# Patient Record
Sex: MALE | Race: WHITE | NOT HISPANIC OR LATINO | Employment: OTHER | URBAN - NONMETROPOLITAN AREA
[De-identification: names, ages, dates, MRNs, and addresses within clinical notes are randomized per-mention and may not be internally consistent; named-entity substitution may affect disease eponyms.]

---

## 2018-09-05 PROBLEM — E78.5 HYPERLIPIDEMIA LDL GOAL <70: Status: ACTIVE | Noted: 2018-09-05

## 2018-09-05 PROBLEM — I71.40 ABDOMINAL AORTIC ANEURYSM (AAA) WITHOUT RUPTURE: Status: ACTIVE | Noted: 2018-09-05

## 2018-09-05 PROBLEM — I10 ESSENTIAL HYPERTENSION: Status: ACTIVE | Noted: 2018-09-05

## 2018-09-05 PROBLEM — I25.119 CORONARY ARTERY DISEASE INVOLVING NATIVE CORONARY ARTERY OF NATIVE HEART WITH ANGINA PECTORIS: Status: ACTIVE | Noted: 2018-09-05

## 2019-07-24 ENCOUNTER — OFFICE VISIT (OUTPATIENT)
Dept: CARDIOLOGY | Facility: CLINIC | Age: 59
End: 2019-07-24

## 2019-07-24 VITALS
HEART RATE: 70 BPM | OXYGEN SATURATION: 99 % | WEIGHT: 199 LBS | HEIGHT: 63 IN | SYSTOLIC BLOOD PRESSURE: 106 MMHG | BODY MASS INDEX: 35.26 KG/M2 | DIASTOLIC BLOOD PRESSURE: 73 MMHG

## 2019-07-24 DIAGNOSIS — R26.89 IMBALANCE: ICD-10-CM

## 2019-07-24 DIAGNOSIS — R07.2 PRECORDIAL PAIN: ICD-10-CM

## 2019-07-24 DIAGNOSIS — I25.119 CORONARY ARTERY DISEASE INVOLVING NATIVE CORONARY ARTERY OF NATIVE HEART WITH ANGINA PECTORIS (HCC): Primary | ICD-10-CM

## 2019-07-24 PROCEDURE — 99406 BEHAV CHNG SMOKING 3-10 MIN: CPT | Performed by: INTERNAL MEDICINE

## 2019-07-24 PROCEDURE — 99204 OFFICE O/P NEW MOD 45 MIN: CPT | Performed by: INTERNAL MEDICINE

## 2019-07-24 PROCEDURE — 93000 ELECTROCARDIOGRAM COMPLETE: CPT | Performed by: INTERNAL MEDICINE

## 2019-07-24 RX ORDER — PANTOPRAZOLE SODIUM 40 MG/1
40 TABLET, DELAYED RELEASE ORAL DAILY
COMMUNITY

## 2019-07-24 RX ORDER — ATORVASTATIN CALCIUM 20 MG/1
20 TABLET, FILM COATED ORAL DAILY
COMMUNITY
End: 2019-11-19 | Stop reason: SDUPTHER

## 2019-07-24 RX ORDER — OXYCODONE HYDROCHLORIDE 15 MG/1
15 TABLET ORAL 4 TIMES DAILY
COMMUNITY

## 2019-07-24 RX ORDER — GABAPENTIN 100 MG/1
100 CAPSULE ORAL 3 TIMES DAILY
COMMUNITY

## 2019-07-24 RX ORDER — MONTELUKAST SODIUM 10 MG/1
10 TABLET ORAL NIGHTLY
COMMUNITY

## 2019-07-24 RX ORDER — ISOSORBIDE MONONITRATE 30 MG/1
30 TABLET, EXTENDED RELEASE ORAL DAILY
Qty: 90 TABLET | Refills: 3 | Status: SHIPPED | OUTPATIENT
Start: 2019-07-24 | End: 2019-09-17 | Stop reason: SINTOL

## 2019-07-24 RX ORDER — CLOPIDOGREL BISULFATE 75 MG/1
75 TABLET ORAL DAILY
COMMUNITY
End: 2019-11-18 | Stop reason: SDUPTHER

## 2019-07-24 RX ORDER — ASPIRIN 81 MG/1
81 TABLET ORAL DAILY
COMMUNITY
End: 2019-11-18 | Stop reason: SDUPTHER

## 2019-07-24 RX ORDER — MECLIZINE HYDROCHLORIDE 25 MG/1
25 TABLET ORAL 3 TIMES DAILY PRN
COMMUNITY

## 2019-07-24 RX ORDER — LISINOPRIL 2.5 MG/1
2.5 TABLET ORAL DAILY
COMMUNITY
End: 2019-11-18

## 2019-07-24 RX ORDER — CETIRIZINE HYDROCHLORIDE 10 MG/1
10 TABLET ORAL DAILY
COMMUNITY

## 2019-07-24 NOTE — PATIENT INSTRUCTIONS
Steps to Quit Smoking  Smoking tobacco can be bad for your health. It can also affect almost every organ in your body. Smoking puts you and people around you at risk for many serious long-lasting (chronic) diseases. Quitting smoking is hard, but it is one of the best things that you can do for your health. It is never too late to quit.  What are the benefits of quitting smoking?  When you quit smoking, you lower your risk for getting serious diseases and conditions. They can include:  · Lung cancer or lung disease.  · Heart disease.  · Stroke.  · Heart attack.  · Not being able to have children (infertility).  · Weak bones (osteoporosis) and broken bones (fractures).    If you have coughing, wheezing, and shortness of breath, those symptoms may get better when you quit. You may also get sick less often. If you are pregnant, quitting smoking can help to lower your chances of having a baby of low birth weight.  What can I do to help me quit smoking?  Talk with your doctor about what can help you quit smoking. Some things you can do (strategies) include:  · Quitting smoking totally, instead of slowly cutting back how much you smoke over a period of time.  · Going to in-person counseling. You are more likely to quit if you go to many counseling sessions.  · Using resources and support systems, such as:  ? Online chats with a counselor.  ? Phone quitlines.  ? Printed self-help materials.  ? Support groups or group counseling.  ? Text messaging programs.  ? Mobile phone apps or applications.  · Taking medicines. Some of these medicines may have nicotine in them. If you are pregnant or breastfeeding, do not take any medicines to quit smoking unless your doctor says it is okay. Talk with your doctor about counseling or other things that can help you.    Talk with your doctor about using more than one strategy at the same time, such as taking medicines while you are also going to in-person counseling. This can help make  quitting easier.  What things can I do to make it easier to quit?  Quitting smoking might feel very hard at first, but there is a lot that you can do to make it easier. Take these steps:  · Talk to your family and friends. Ask them to support and encourage you.  · Call phone quitlines, reach out to support groups, or work with a counselor.  · Ask people who smoke to not smoke around you.  · Avoid places that make you want (trigger) to smoke, such as:  ? Bars.  ? Parties.  ? Smoke-break areas at work.  · Spend time with people who do not smoke.  · Lower the stress in your life. Stress can make you want to smoke. Try these things to help your stress:  ? Getting regular exercise.  ? Deep-breathing exercises.  ? Yoga.  ? Meditating.  ? Doing a body scan. To do this, close your eyes, focus on one area of your body at a time from head to toe, and notice which parts of your body are tense. Try to relax the muscles in those areas.  · Download or buy apps on your mobile phone or tablet that can help you stick to your quit plan. There are many free apps, such as QuitGuide from the CDC (Centers for Disease Control and Prevention). You can find more support from smokefree.gov and other websites.    This information is not intended to replace advice given to you by your health care provider. Make sure you discuss any questions you have with your health care provider.  Document Released: 10/14/2010 Document Revised: 08/15/2017 Document Reviewed: 05/03/2016  [employer] Interactive Patient Education © 2019 [employer]          Calorie Counting for Weight Loss  Calories are units of energy. Your body needs a certain amount of calories from food to keep you going throughout the day. When you eat more calories than your body needs, your body stores the extra calories as fat. When you eat fewer calories than your body needs, your body burns fat to get the energy it needs.  Calorie counting means keeping track of how many calories you  eat and drink each day. Calorie counting can be helpful if you need to lose weight. If you make sure to eat fewer calories than your body needs, you should lose weight. Ask your health care provider what a healthy weight is for you.  For calorie counting to work, you will need to eat the right number of calories in a day in order to lose a healthy amount of weight per week. A dietitian can help you determine how many calories you need in a day and will give you suggestions on how to reach your calorie goal.  · A healthy amount of weight to lose per week is usually 1-2 lb (0.5-0.9 kg). This usually means that your daily calorie intake should be reduced by 500-750 calories.  · Eating 1,200 - 1,500 calories per day can help most women lose weight.  · Eating 1,500 - 1,800 calories per day can help most men lose weight.    What is my plan?  My goal is to have __________ calories per day.  If I have this many calories per day, I should lose around __________ pounds per week.  What do I need to know about calorie counting?  In order to meet your daily calorie goal, you will need to:  · Find out how many calories are in each food you would like to eat. Try to do this before you eat.  · Decide how much of the food you plan to eat.  · Write down what you ate and how many calories it had. Doing this is called keeping a food log.    To successfully lose weight, it is important to balance calorie counting with a healthy lifestyle that includes regular activity. Aim for 150 minutes of moderate exercise (such as walking) or 75 minutes of vigorous exercise (such as running) each week.  Where do I find calorie information?    The number of calories in a food can be found on a Nutrition Facts label. If a food does not have a Nutrition Facts label, try to look up the calories online or ask your dietitian for help.  Remember that calories are listed per serving. If you choose to have more than one serving of a food, you will have to  "multiply the calories per serving by the amount of servings you plan to eat. For example, the label on a package of bread might say that a serving size is 1 slice and that there are 90 calories in a serving. If you eat 1 slice, you will have eaten 90 calories. If you eat 2 slices, you will have eaten 180 calories.  How do I keep a food log?  Immediately after each meal, record the following information in your food log:  · What you ate. Don't forget to include toppings, sauces, and other extras on the food.  · How much you ate. This can be measured in cups, ounces, or number of items.  · How many calories each food and drink had.  · The total number of calories in the meal.    Keep your food log near you, such as in a small notebook in your pocket, or use a mobile chelle or website. Some programs will calculate calories for you and show you how many calories you have left for the day to meet your goal.  What are some calorie counting tips?  · Use your calories on foods and drinks that will fill you up and not leave you hungry:  ? Some examples of foods that fill you up are nuts and nut butters, vegetables, lean proteins, and high-fiber foods like whole grains. High-fiber foods are foods with more than 5 g fiber per serving.  ? Drinks such as sodas, specialty coffee drinks, alcohol, and juices have a lot of calories, yet do not fill you up.  · Eat nutritious foods and avoid empty calories. Empty calories are calories you get from foods or beverages that do not have many vitamins or protein, such as candy, sweets, and soda. It is better to have a nutritious high-calorie food (such as an avocado) than a food with few nutrients (such as a bag of chips).  · Know how many calories are in the foods you eat most often. This will help you calculate calorie counts faster.  · Pay attention to calories in drinks. Low-calorie drinks include water and unsweetened drinks.  · Pay attention to nutrition labels for \"low fat\" or \"fat " "free\" foods. These foods sometimes have the same amount of calories or more calories than the full fat versions. They also often have added sugar, starch, or salt, to make up for flavor that was removed with the fat.  · Find a way of tracking calories that works for you. Get creative. Try different apps or programs if writing down calories does not work for you.  What are some portion control tips?  · Know how many calories are in a serving. This will help you know how many servings of a certain food you can have.  · Use a measuring cup to measure serving sizes. You could also try weighing out portions on a kitchen scale. With time, you will be able to estimate serving sizes for some foods.  · Take some time to put servings of different foods on your favorite plates, bowls, and cups so you know what a serving looks like.  · Try not to eat straight from a bag or box. Doing this can lead to overeating. Put the amount you would like to eat in a cup or on a plate to make sure you are eating the right portion.  · Use smaller plates, glasses, and bowls to prevent overeating.  · Try not to multitask (for example, watch TV or use your computer) while eating. If it is time to eat, sit down at a table and enjoy your food. This will help you to know when you are full. It will also help you to be aware of what you are eating and how much you are eating.  What are tips for following this plan?  Reading food labels  · Check the calorie count compared to the serving size. The serving size may be smaller than what you are used to eating.  · Check the source of the calories. Make sure the food you are eating is high in vitamins and protein and low in saturated and trans fats.  Shopping  · Read nutrition labels while you shop. This will help you make healthy decisions before you decide to purchase your food.  · Make a grocery list and stick to it.  Cooking  · Try to cook your favorite foods in a healthier way. For example, try baking " instead of frying.  · Use low-fat dairy products.  Meal planning  · Use more fruits and vegetables. Half of your plate should be fruits and vegetables.  · Include lean proteins like poultry and fish.  How do I count calories when eating out?  · Ask for smaller portion sizes.  · Consider sharing an entree and sides instead of getting your own entree.  · If you get your own entree, eat only half. Ask for a box at the beginning of your meal and put the rest of your entree in it so you are not tempted to eat it.  · If calories are listed on the menu, choose the lower calorie options.  · Choose dishes that include vegetables, fruits, whole grains, low-fat dairy products, and lean protein.  · Choose items that are boiled, broiled, grilled, or steamed. Stay away from items that are buttered, battered, fried, or served with cream sauce. Items labeled “crispy” are usually fried, unless stated otherwise.  · Choose water, low-fat milk, unsweetened iced tea, or other drinks without added sugar. If you want an alcoholic beverage, choose a lower calorie option such as a glass of wine or light beer.  · Ask for dressings, sauces, and syrups on the side. These are usually high in calories, so you should limit the amount you eat.  · If you want a salad, choose a garden salad and ask for grilled meats. Avoid extra toppings like sharpe, cheese, or fried items. Ask for the dressing on the side, or ask for olive oil and vinegar or lemon to use as dressing.  · Estimate how many servings of a food you are given. For example, a serving of cooked rice is ½ cup or about the size of half a baseball. Knowing serving sizes will help you be aware of how much food you are eating at restaurants. The list below tells you how big or small some common portion sizes are based on everyday objects:  ? 1 oz--4 stacked dice.  ? 3 oz--1 deck of cards.  ? 1 tsp--1 die.  ? 1 Tbsp--½ a ping-pong ball.  ? 2 Tbsp--1 ping-pong ball.  ? ½ cup--½ baseball.  ? 1  cup--1 baseball.  Summary  · Calorie counting means keeping track of how many calories you eat and drink each day. If you eat fewer calories than your body needs, you should lose weight.  · A healthy amount of weight to lose per week is usually 1-2 lb (0.5-0.9 kg). This usually means reducing your daily calorie intake by 500-750 calories.  · The number of calories in a food can be found on a Nutrition Facts label. If a food does not have a Nutrition Facts label, try to look up the calories online or ask your dietitian for help.  · Use your calories on foods and drinks that will fill you up, and not on foods and drinks that will leave you hungry.  · Use smaller plates, glasses, and bowls to prevent overeating.  This information is not intended to replace advice given to you by your health care provider. Make sure you discuss any questions you have with your health care provider.  Document Released: 12/18/2006 Document Revised: 11/17/2017 Document Reviewed: 11/17/2017  [employer] Interactive Patient Education © 2019 [employer]

## 2019-07-24 NOTE — PROGRESS NOTES
Patient Identification:  Name: Na Laguerre  Age: 58 y.o.  Sex: male  :  1960  MRN: [id number]             Chief Complaint: Chest pain    History of Present Illness:    58-year-old white male, he has a significant history of coronary artery disease, at this point the details are unclear.  He apparently has had a myocardial infarction in the past which sounds like an inferior wall infarct, he states that he had 2 stents placed in [place] apparently.  He apparently had another catheterization at some point but it is unclear where this took place.    For the past 2 months he has had intermittent episodes of off-and-on chest discomfort described as tightness, moderately intense radiating to the left shoulder.  He also reports staggering spells, distinctly denies dizziness but reports staggering like he is drunk at times.  He has mild shortness of breath.  He unfortunately continues to smoke.  He has been compliant with his medications.  He has not had any recent cardiac work-up.    Past Medical History:  Past Medical History:   Diagnosis Date   • GERD (gastroesophageal reflux disease)    • Hyperlipidemia    • Myocardial infarction (CMS/HCC)        Past Surgical History:  Past Surgical History:   Procedure Laterality Date   • CARDIAC CATHETERIZATION     • COLONOSCOPY     • CORONARY STENT PLACEMENT          Home Meds:  Current Outpatient Medications   Medication Sig Dispense Refill   • aspirin 81 MG EC tablet Take 81 mg by mouth Daily.     • atorvastatin (LIPITOR) 20 MG tablet Take 20 mg by mouth Daily.     • cetirizine (zyrTEC) 10 MG tablet Take 10 mg by mouth Daily.     • clopidogrel (PLAVIX) 75 MG tablet Take 75 mg by mouth Daily.     • gabapentin (NEURONTIN) 100 MG capsule Take 100 mg by mouth 3 (Three) Times a Day.     • lisinopril (PRINIVIL,ZESTRIL) 2.5 MG tablet Take 2.5 mg by mouth Daily.     • meclizine (ANTIVERT) 25 MG tablet Take 25 mg by mouth 3 (Three) Times a Day As Needed for dizziness.     •  "METOPROLOL TARTRATE PO Take 12.5 mg by mouth 2 (Two) Times a Day.     • montelukast (SINGULAIR) 10 MG tablet Take 10 mg by mouth Every Night.     • oxyCODONE (ROXICODONE) 15 MG immediate release tablet Take 15 mg by mouth 4 (Four) Times a Day.     • pantoprazole (PROTONIX) 40 MG EC tablet Take 40 mg by mouth Daily.       No current facility-administered medications for this visit.          Allergies:  Allergies   Allergen Reactions   • Codeine Itching       Social History:   Social History     Tobacco Use   • Smoking status: Current Every Day Smoker     Packs/day: 2.00     Years: 35.00     Pack years: 70.00     Types: Cigarettes   • Smokeless tobacco: Never Used   Substance Use Topics   • Alcohol use: No     Frequency: Never        Family History:  No family history on file.     Review of Systems  Review of Systems   Constitution: Positive for malaise/fatigue and weight loss (30lb x 3 months).   Eyes:        Wears glasses   Cardiovascular: Positive for chest pain (mid chest ) and dyspnea on exertion. Negative for irregular heartbeat, leg swelling, near-syncope, orthopnea and palpitations.   Respiratory: Negative for shortness of breath.    Endocrine: Positive for cold intolerance.   Musculoskeletal: Positive for back pain, joint pain, joint swelling and muscle weakness.        Difficulty in walking     Neurological: Positive for dizziness, headaches and numbness (tingling in hands).   Psychiatric/Behavioral: Positive for memory loss. The patient is nervous/anxious.          Physical Exam:  /73 (BP Location: Right arm, Patient Position: Sitting, Cuff Size: Adult)   Pulse 70   Ht 160 cm (63\")   Wt 90.3 kg (199 lb)   SpO2 99%   BMI 35.25 kg/m²          ECG 12 Lead  Date/Time: 7/24/2019 1:39 PM  Performed by: HENRI Watkins MD  Authorized by: HENRI Watkins MD   Comparison: not compared with previous ECG   Previous ECG: no previous ECG available  Rhythm: sinus bradycardia  Rate: bradycardic  BPM: " 59    Clinical impression: normal ECG  Comments: No ST changes             General Appearance:   · well developed  · well nourished  HENT:   · oropharynx moist  · lips not cyanotic  Neck:  · thyroid not enlarged  · supple  Respiratory:  · no respiratory distress  · normal breath sounds  · no rales  Cardiovascular:  · no jugular venous distention  · regular rhythm  · apical impulse normal  · S1 normal, S2 normal  · no S3, no S4   · no murmur  · no rub, no thrill  · carotid pulses normal; no bruit  · pedal pulses normal  · lower extremity edema: none    Gastrointestinal:   · bowel sounds normal  · non-tender  · no hepatomegaly, no splenomegaly  Musculoskeletal:  · no clubbing of fingers.   · normocephalic, head atraumatic  Skin:   · warm, dry  Psychiatric:  · judgement and insight appropriate  · normal mood and affect  ·   Data Review:    Care note reviewed, laboratory data is pending as are the previous cardiac catheterization reports.    I have personally reviewed the EKG tracing and it showed: Sinus bradycardia, rate 59, no ST changes, otherwise normal EKG.      Patient's Body mass index is 35.25 kg/m². BMI is above normal parameters. Recommendations include: educational material and nutrition counseling.       Na Laguerre is a current cigarettes user.  He currently smokes 1 pack of cigarettes per day for a duration of 30 years. I have educated him on the risk of diseases from using tobacco products such as cancer, COPD and heart diease.     I advised him to quit and he is willing to quit. We have discussed the following method/s for tobacco cessation:  Education Material Counseling.  Together we have set a quit date for The patient is still contemplating this.  He will follow up with me in a few month or sooner to check on his progress.    I spent 8 minutes counseling the patient.            DIAGNOSES:     Diagnosis Plan   1. Coronary artery disease involving native coronary artery of native heart with angina  pectoris (CMS/HCC)  Stress Test With Myocardial Perfusion   2. Precordial pain  Stress Test With Myocardial Perfusion   3. Imbalance  US Carotid Bilateral         MANAGEMENT PLAN:    Mr. Laguerre has known coronary artery disease, with previous PCI.  We have not obtained all of his medical records yet.  He has been having intermittent episodes of what sounds like angina over the past couple of months.  He has high risk for recurrent coronary stenosis due to ongoing smoking and known coronary artery disease.  I am adding long-acting nitrate to his regimen.  He will continue dual antiplatelet therapy, statin, beta-blocker.  Stress imaging will be scheduled to evaluate for ischemia burden.  We will try to obtain his records as well and follow-up will be arranged.  The patient is agreeable with this plan.          HENRI Watkins MD  7/24/2019    1:38 PM

## 2019-08-08 ENCOUNTER — HOSPITAL ENCOUNTER (OUTPATIENT)
Dept: NUCLEAR MEDICINE | Facility: HOSPITAL | Age: 59
Discharge: HOME | End: 2019-08-08

## 2019-08-08 ENCOUNTER — HOSPITAL ENCOUNTER (OUTPATIENT)
Dept: CARDIOLOGY | Facility: HOSPITAL | Age: 59
Discharge: HOME | End: 2019-08-08

## 2019-08-08 DIAGNOSIS — R26.89 IMBALANCE: ICD-10-CM

## 2019-08-08 DIAGNOSIS — R07.2 PRECORDIAL PAIN: ICD-10-CM

## 2019-08-08 DIAGNOSIS — I25.119 CORONARY ARTERY DISEASE INVOLVING NATIVE CORONARY ARTERY OF NATIVE HEART WITH ANGINA PECTORIS (HCC): ICD-10-CM

## 2019-08-08 LAB
BH CV NUCLEAR PRIOR STUDY: 3
BH CV STRESS BP STAGE 1: NORMAL
BH CV STRESS BP STAGE 2: NORMAL
BH CV STRESS COMMENTS STAGE 1: NORMAL
BH CV STRESS COMMENTS STAGE 2: NORMAL
BH CV STRESS DOSE REGADENOSON STAGE 1: 0.4
BH CV STRESS DURATION MIN STAGE 1: 0
BH CV STRESS DURATION MIN STAGE 2: 4
BH CV STRESS DURATION SEC STAGE 1: 10
BH CV STRESS DURATION SEC STAGE 2: 0
BH CV STRESS HR STAGE 1: 89
BH CV STRESS HR STAGE 2: 84
BH CV STRESS PROTOCOL 1: NORMAL
BH CV STRESS RECOVERY BP: NORMAL MMHG
BH CV STRESS RECOVERY HR: 62 BPM
BH CV STRESS STAGE 1: 1
BH CV STRESS STAGE 2: 2
LV EF NUC BP: 69 %
MAXIMAL PREDICTED HEART RATE: 161 BPM
PERCENT MAX PREDICTED HR: 55.28 %
STRESS BASELINE BP: NORMAL MMHG
STRESS BASELINE HR: 59 BPM
STRESS PERCENT HR: 65 %
STRESS POST PEAK BP: NORMAL MMHG
STRESS POST PEAK HR: 89 BPM
STRESS TARGET HR: 137 BPM

## 2019-08-08 PROCEDURE — A9500 TC99M SESTAMIBI: HCPCS | Performed by: INTERNAL MEDICINE

## 2019-08-08 PROCEDURE — 0 TECHNETIUM SESTAMIBI: Performed by: INTERNAL MEDICINE

## 2019-08-08 PROCEDURE — 93017 CV STRESS TEST TRACING ONLY: CPT

## 2019-08-08 PROCEDURE — 78452 HT MUSCLE IMAGE SPECT MULT: CPT | Performed by: INTERNAL MEDICINE

## 2019-08-08 PROCEDURE — 93018 CV STRESS TEST I&R ONLY: CPT | Performed by: INTERNAL MEDICINE

## 2019-08-08 PROCEDURE — 78452 HT MUSCLE IMAGE SPECT MULT: CPT

## 2019-08-08 PROCEDURE — 25010000002 REGADENOSON 0.4 MG/5ML SOLUTION: Performed by: INTERNAL MEDICINE

## 2019-08-08 PROCEDURE — 93880 EXTRACRANIAL BILAT STUDY: CPT

## 2019-08-08 PROCEDURE — 93880 EXTRACRANIAL BILAT STUDY: CPT | Performed by: RADIOLOGY

## 2019-08-08 RX ADMIN — TECHNETIUM TC 99M SESTAMIBI 1 DOSE: 1 INJECTION INTRAVENOUS at 09:35

## 2019-08-08 RX ADMIN — TECHNETIUM TC 99M SESTAMIBI 1 DOSE: 1 INJECTION INTRAVENOUS at 11:05

## 2019-08-08 RX ADMIN — REGADENOSON 0.4 MG: 0.08 INJECTION, SOLUTION INTRAVENOUS at 11:05

## 2019-09-17 ENCOUNTER — OFFICE VISIT (OUTPATIENT)
Dept: CARDIOLOGY | Facility: CLINIC | Age: 59
End: 2019-09-17

## 2019-09-17 VITALS
SYSTOLIC BLOOD PRESSURE: 111 MMHG | DIASTOLIC BLOOD PRESSURE: 79 MMHG | HEART RATE: 63 BPM | WEIGHT: 200 LBS | HEIGHT: 63 IN | BODY MASS INDEX: 35.44 KG/M2 | RESPIRATION RATE: 16 BRPM

## 2019-09-17 DIAGNOSIS — I10 ESSENTIAL HYPERTENSION: ICD-10-CM

## 2019-09-17 DIAGNOSIS — R07.2 PRECORDIAL PAIN: ICD-10-CM

## 2019-09-17 DIAGNOSIS — E78.5 HYPERLIPIDEMIA LDL GOAL <70: ICD-10-CM

## 2019-09-17 DIAGNOSIS — I25.119 CORONARY ARTERY DISEASE INVOLVING NATIVE CORONARY ARTERY OF NATIVE HEART WITH ANGINA PECTORIS (HCC): Primary | ICD-10-CM

## 2019-09-17 PROCEDURE — 99213 OFFICE O/P EST LOW 20 MIN: CPT | Performed by: NURSE PRACTITIONER

## 2019-09-17 RX ORDER — RANOLAZINE 500 MG/1
500 TABLET, EXTENDED RELEASE ORAL 2 TIMES DAILY
Qty: 60 TABLET | Refills: 5 | Status: SHIPPED | OUTPATIENT
Start: 2019-09-17 | End: 2020-03-04

## 2019-09-17 NOTE — PROGRESS NOTES
Makenna Peacock PA  Na Laguerre  1960 09/17/2019    Patient Active Problem List   Diagnosis   • Coronary artery disease involving native coronary artery of native heart with angina pectoris (CMS/HCC)   • Essential hypertension   • Hyperlipidemia LDL goal <70   • Abdominal aortic aneurysm (AAA) without rupture (CMS/[facility])       Dear Makenna Peacock PA:    Subjective     Chief Complaint   Patient presents with   • Coronary Artery Disease   • Medication     Verbal    • Results     stress and US carotid           History of Present Illness:    Na Laguerre is a 59 y.o. male with a history of ASCVD status post PCI about 4 years ago with further details unavailable, hypertension, and dyslipidemia.  He presents today for routine cardiology follow-up.  Previously, he was evaluated for chest discomfort and balance disturbance.  He was evaluated further with a carotid Doppler which was unremarkable.  He was also evaluated further with a Lexiscan stress test which revealed no evidence of ischemia.  He reports he has been doing very well over the past several months.  He has occasional chest tightness with moderate exertion.  This is typically alleviated quickly with rest.  He has no associated symptoms.  He does have chronic dyspnea with mild to moderate exertion and reports he has been diagnosed with COPD in the past.  He is a current smoker.  He denies any palpitations, dizziness, lightheadedness, near-syncope, or syncope.  He was prescribed Imdur for his chest pains but was unable to tolerate the medication due to headache.          Allergies   Allergen Reactions   • Codeine Itching   :      Current Outpatient Medications:   •  aspirin 81 MG EC tablet, Take 81 mg by mouth Daily., Disp: , Rfl:   •  atorvastatin (LIPITOR) 20 MG tablet, Take 20 mg by mouth Daily., Disp: , Rfl:   •  cetirizine (zyrTEC) 10 MG tablet, Take 10 mg by mouth Daily., Disp: , Rfl:   •  clopidogrel (PLAVIX) 75 MG tablet, Take 75 mg by mouth  "Daily., Disp: , Rfl:   •  gabapentin (NEURONTIN) 100 MG capsule, Take 100 mg by mouth 3 (Three) Times a Day., Disp: , Rfl:   •  lisinopril (PRINIVIL,ZESTRIL) 2.5 MG tablet, Take 2.5 mg by mouth Daily., Disp: , Rfl:   •  meclizine (ANTIVERT) 25 MG tablet, Take 25 mg by mouth 3 (Three) Times a Day As Needed for dizziness., Disp: , Rfl:   •  METOPROLOL TARTRATE PO, Take 12.5 mg by mouth 2 (Two) Times a Day., Disp: , Rfl:   •  montelukast (SINGULAIR) 10 MG tablet, Take 10 mg by mouth Every Night., Disp: , Rfl:   •  oxyCODONE (ROXICODONE) 15 MG immediate release tablet, Take 15 mg by mouth 4 (Four) Times a Day., Disp: , Rfl:   •  pantoprazole (PROTONIX) 40 MG EC tablet, Take 40 mg by mouth Daily., Disp: , Rfl:   •  ranolazine (RANEXA) 500 MG 12 hr tablet, Take 1 tablet by mouth 2 (Two) Times a Day., Disp: 60 tablet, Rfl: 5      The following portions of the patient's history were reviewed and updated as appropriate: allergies, current medications, past family history, past medical history, past social history, past surgical history and problem list.    Social History     Tobacco Use   • Smoking status: Current Every Day Smoker     Packs/day: 2.00     Years: 35.00     Pack years: 70.00     Types: Cigarettes   • Smokeless tobacco: Never Used   Substance Use Topics   • Alcohol use: No     Frequency: Never   • Drug use: No       Review of Systems   Constitution: Negative for weakness and malaise/fatigue.   Cardiovascular: Positive for chest pain. Negative for dyspnea on exertion, irregular heartbeat, leg swelling, near-syncope, orthopnea, palpitations, paroxysmal nocturnal dyspnea and syncope.   Respiratory: Negative for cough, shortness of breath and wheezing.    Neurological: Positive for loss of balance. Negative for dizziness and light-headedness.       Objective   Vitals:    09/17/19 1133   BP: 111/79   BP Location: Left arm   Pulse: 63   Resp: 16   Weight: 90.7 kg (200 lb)   Height: 160 cm (62.99\")     Body mass index " is 35.44 kg/m².        Physical Exam   Constitutional: He is oriented to person, place, and time. He appears well-developed and well-nourished.   HENT:   Head: Normocephalic and atraumatic.   Cardiovascular: Normal rate, regular rhythm and normal heart sounds. Exam reveals no S3 and no S4.   No murmur heard.  Pulmonary/Chest: Effort normal and breath sounds normal. He has no wheezes. He has no rales.   Abdominal: Soft. Bowel sounds are normal.   Musculoskeletal: He exhibits no edema.   Ambulates with cane   Neurological: He is alert and oriented to person, place, and time.   Skin: Skin is warm and dry.   Psychiatric: He has a normal mood and affect. His behavior is normal.           Procedures      Assessment/Plan    Diagnosis Plan   1. Coronary artery disease involving native coronary artery of native heart with angina pectoris (CMS/[facility])     2. Precordial pain  ranolazine (RANEXA) 500 MG 12 hr tablet   3. Essential hypertension     4. Hyperlipidemia LDL goal <70                  Recommendations:    1.  For his chest pains, will start Ranexa 500 mg twice daily.    2.  He will continue low-dose aspirin, Plavix, atorvastatin, lisinopril, and metoprolol    3.  We will request records of his most recent lipid panel and adjust statin if needed.    4.  I have discussed stress test results and carotid Doppler results with the patient today.    5.  We will request records from his previous encounter for PCI.    6.  Follow-up in 2 months and if needed.      Return in about 6 weeks (around 10/29/2019) for Recheck.    As always, I appreciate very much the opportunity to participate in the cardiovascular care of your patients.      With Best Regards,    DONNELL Peralta

## 2019-10-10 ENCOUNTER — TELEPHONE (OUTPATIENT)
Dept: CARDIOLOGY | Facility: CLINIC | Age: 59
End: 2019-10-10

## 2019-10-31 ENCOUNTER — TRANSCRIBE ORDERS (OUTPATIENT)
Dept: ADMINISTRATIVE | Facility: HOSPITAL | Age: 59
End: 2019-10-31

## 2019-10-31 DIAGNOSIS — R26.81 UNSTEADY: Primary | ICD-10-CM

## 2019-11-04 ENCOUNTER — APPOINTMENT (OUTPATIENT)
Dept: CT IMAGING | Facility: HOSPITAL | Age: 59
End: 2019-11-04

## 2019-11-12 ENCOUNTER — HOSPITAL ENCOUNTER (OUTPATIENT)
Dept: GENERAL RADIOLOGY | Facility: HOSPITAL | Age: 59
Discharge: HOME | End: 2019-11-12

## 2019-11-12 ENCOUNTER — HOSPITAL ENCOUNTER (OUTPATIENT)
Dept: CT IMAGING | Facility: HOSPITAL | Age: 59
Discharge: HOME | End: 2019-11-12
Admitting: FAMILY MEDICINE

## 2019-11-12 ENCOUNTER — TRANSCRIBE ORDERS (OUTPATIENT)
Dept: ADMINISTRATIVE | Facility: HOSPITAL | Age: 59
End: 2019-11-12

## 2019-11-12 DIAGNOSIS — J44.9 CHRONIC OBSTRUCTIVE PULMONARY DISEASE, UNSPECIFIED COPD TYPE (HCC): Primary | ICD-10-CM

## 2019-11-12 DIAGNOSIS — R26.81 UNSTEADY: ICD-10-CM

## 2019-11-12 PROCEDURE — 71046 X-RAY EXAM CHEST 2 VIEWS: CPT

## 2019-11-12 PROCEDURE — 70450 CT HEAD/BRAIN W/O DYE: CPT

## 2019-11-12 PROCEDURE — 70450 CT HEAD/BRAIN W/O DYE: CPT | Performed by: RADIOLOGY

## 2019-11-12 PROCEDURE — 71046 X-RAY EXAM CHEST 2 VIEWS: CPT | Performed by: RADIOLOGY

## 2019-11-18 ENCOUNTER — OFFICE VISIT (OUTPATIENT)
Dept: CARDIOLOGY | Facility: CLINIC | Age: 59
End: 2019-11-18

## 2019-11-18 VITALS
HEART RATE: 54 BPM | OXYGEN SATURATION: 96 % | SYSTOLIC BLOOD PRESSURE: 101 MMHG | WEIGHT: 202.8 LBS | RESPIRATION RATE: 16 BRPM | BODY MASS INDEX: 37.32 KG/M2 | DIASTOLIC BLOOD PRESSURE: 68 MMHG | HEIGHT: 62 IN

## 2019-11-18 DIAGNOSIS — E78.5 HYPERLIPIDEMIA LDL GOAL <70: ICD-10-CM

## 2019-11-18 DIAGNOSIS — I25.119 CORONARY ARTERY DISEASE INVOLVING NATIVE CORONARY ARTERY OF NATIVE HEART WITH ANGINA PECTORIS (HCC): ICD-10-CM

## 2019-11-18 DIAGNOSIS — I71.40 ABDOMINAL AORTIC ANEURYSM (AAA) WITHOUT RUPTURE (HCC): ICD-10-CM

## 2019-11-18 DIAGNOSIS — I10 ESSENTIAL HYPERTENSION: Primary | ICD-10-CM

## 2019-11-18 PROCEDURE — 99214 OFFICE O/P EST MOD 30 MIN: CPT | Performed by: NURSE PRACTITIONER

## 2019-11-18 RX ORDER — DOCUSATE SODIUM 100 MG/1
100 CAPSULE, LIQUID FILLED ORAL AS NEEDED
COMMUNITY
End: 2022-04-05

## 2019-11-18 RX ORDER — CLOPIDOGREL BISULFATE 75 MG/1
75 TABLET ORAL DAILY
Qty: 30 TABLET | Refills: 11 | Status: SHIPPED | OUTPATIENT
Start: 2019-11-18

## 2019-11-18 RX ORDER — ASPIRIN 81 MG/1
81 TABLET ORAL DAILY
Qty: 30 TABLET | Refills: 11 | Status: SHIPPED | OUTPATIENT
Start: 2019-11-18 | End: 2022-04-05

## 2019-11-18 NOTE — PROGRESS NOTES
Karen Angulo MD  Na Laguerre  1960 11/18/2019    Patient Active Problem List   Diagnosis   • Coronary artery disease involving native coronary artery of native heart with angina pectoris (CMS/HCC)   • Essential hypertension   • Hyperlipidemia LDL goal <70   • Abdominal aortic aneurysm (AAA) without rupture (CMS/HCC)       Dear Karen Angulo MD:    Subjective     Chief Complaint   Patient presents with   • Coronary Artery Disease     2 mos follow up   • Palpitations     occas races   • Shortness of Breath     routine activty   • Med Management     list provided           History of Present Illness:    Na Laguerre is a 59 y.o. male with a history of COPD, ASCVD status post PCI about 4 years ago, hypertension, and dyslipidemia.  He presents today for routine cardiology follow-up.  Previously he was having some chest tightness with moderate exertion.  Ranexa was prescribed and the patient's chest pains have now resolved.  He does have chronic dyspnea with mild to moderate exertion he relates with COPD.  He denies any palpitations, dizziness, or lightheadedness.  He does report a chronic, dry hacking cough.  He has tried several medications to help with this and has had no relief.  His PCP has recently ordered a chest x-ray.  He denies weight gain, edema, orthopnea, or PND.          Allergies   Allergen Reactions   • Codeine Itching   :      Current Outpatient Medications:   •  aspirin 81 MG EC tablet, Take 1 tablet by mouth Daily., Disp: 30 tablet, Rfl: 11  •  atorvastatin (LIPITOR) 20 MG tablet, Take 20 mg by mouth Daily., Disp: , Rfl:   •  cetirizine (zyrTEC) 10 MG tablet, Take 10 mg by mouth Daily., Disp: , Rfl:   •  clopidogrel (PLAVIX) 75 MG tablet, Take 1 tablet by mouth Daily., Disp: 30 tablet, Rfl: 11  •  docusate sodium (COLACE) 100 MG capsule, Take 100 mg by mouth As Needed for Constipation., Disp: , Rfl:   •  gabapentin (NEURONTIN) 100 MG capsule, Take 100 mg by mouth 3 (Three) Times a Day.,  "Disp: , Rfl:   •  meclizine (ANTIVERT) 25 MG tablet, Take 25 mg by mouth 3 (Three) Times a Day As Needed for dizziness., Disp: , Rfl:   •  METOPROLOL TARTRATE PO, Take 12.5 mg by mouth 2 (Two) Times a Day., Disp: , Rfl:   •  montelukast (SINGULAIR) 10 MG tablet, Take 10 mg by mouth Every Night., Disp: , Rfl:   •  oxyCODONE (ROXICODONE) 15 MG immediate release tablet, Take 15 mg by mouth 4 (Four) Times a Day., Disp: , Rfl:   •  pantoprazole (PROTONIX) 40 MG EC tablet, Take 40 mg by mouth Daily., Disp: , Rfl:   •  ranolazine (RANEXA) 500 MG 12 hr tablet, Take 1 tablet by mouth 2 (Two) Times a Day., Disp: 60 tablet, Rfl: 5      The following portions of the patient's history were reviewed and updated as appropriate: allergies, current medications, past family history, past medical history, past social history, past surgical history and problem list.    Social History     Tobacco Use   • Smoking status: Current Every Day Smoker     Packs/day: 2.00     Years: 35.00     Pack years: 70.00     Types: Cigarettes   • Smokeless tobacco: Never Used   Substance Use Topics   • Alcohol use: No     Frequency: Never   • Drug use: No       Review of Systems   Constitution: Negative for weakness and malaise/fatigue.   Cardiovascular: Positive for palpitations. Negative for chest pain, dyspnea on exertion, irregular heartbeat, leg swelling, near-syncope, orthopnea, paroxysmal nocturnal dyspnea and syncope.   Respiratory: Positive for shortness of breath. Negative for cough and wheezing.    Neurological: Negative for dizziness and light-headedness.       Objective   Vitals:    11/18/19 1145   BP: 101/68   Pulse: 54   Resp: 16   SpO2: 96%   Weight: 92 kg (202 lb 12.8 oz)   Height: 157.5 cm (62\")     Body mass index is 37.09 kg/m².        Physical Exam   Constitutional: He is oriented to person, place, and time. He appears well-developed and well-nourished.   HENT:   Head: Normocephalic and atraumatic.   Cardiovascular: Normal rate, " regular rhythm and normal heart sounds. Exam reveals no S3 and no S4.   No murmur heard.  Pulmonary/Chest: Effort normal and breath sounds normal. He has no wheezes. He has no rales.   Abdominal: Soft. Bowel sounds are normal.   Musculoskeletal: He exhibits no edema.   Neurological: He is alert and oriented to person, place, and time.   Skin: Skin is warm and dry.   Psychiatric: He has a normal mood and affect. His behavior is normal.       Procedures      Assessment/Plan    Diagnosis Plan   1. Essential hypertension     2. Hyperlipidemia LDL goal <70     3. Coronary artery disease involving native coronary artery of native heart with angina pectoris (CMS/[facility])  aspirin 81 MG EC tablet    clopidogrel (PLAVIX) 75 MG tablet   4. Abdominal aortic aneurysm (AAA) without rupture (CMS/[facility])                  Recommendations:    1. Continue low dose aspirin, atorvastatin, Plavix, metoprolol, and Ranexa.    2. Will discontinue lisinopril to see if this helps to alleviate the cough.    3. Will request most recent lipid panel from his PCP.    4. Follow up in 4 months and PRN.    Return in about 4 months (around 3/18/2020) for Recheck.    As always, I appreciate very much the opportunity to participate in the cardiovascular care of your patients.      With Best Regards,    DONNELL Peralta

## 2019-11-19 RX ORDER — ATORVASTATIN CALCIUM 40 MG/1
40 TABLET, FILM COATED ORAL DAILY
Qty: 30 TABLET | Refills: 2 | Status: SHIPPED | OUTPATIENT
Start: 2019-11-19 | End: 2022-04-05 | Stop reason: SDUPTHER

## 2020-03-04 DIAGNOSIS — R07.2 PRECORDIAL PAIN: ICD-10-CM

## 2020-03-04 RX ORDER — RANOLAZINE 500 MG/1
500 TABLET, EXTENDED RELEASE ORAL 2 TIMES DAILY
Qty: 60 TABLET | Refills: 0 | Status: SHIPPED | OUTPATIENT
Start: 2020-03-04 | End: 2020-04-01

## 2020-04-01 DIAGNOSIS — R07.2 PRECORDIAL PAIN: ICD-10-CM

## 2020-04-01 RX ORDER — RANOLAZINE 500 MG/1
500 TABLET, EXTENDED RELEASE ORAL 2 TIMES DAILY
Qty: 60 TABLET | Refills: 0 | Status: SHIPPED | OUTPATIENT
Start: 2020-04-01

## 2020-08-17 RX ORDER — ISOSORBIDE MONONITRATE 30 MG/1
30 TABLET, EXTENDED RELEASE ORAL DAILY
Qty: 90 TABLET | Refills: 3 | OUTPATIENT
Start: 2020-08-17

## 2021-02-22 DIAGNOSIS — Z23 IMMUNIZATION DUE: ICD-10-CM

## 2021-12-21 ENCOUNTER — TRANSCRIBE ORDERS (OUTPATIENT)
Dept: ADMINISTRATIVE | Facility: HOSPITAL | Age: 61
End: 2021-12-21

## 2021-12-21 DIAGNOSIS — R26.89 BALANCE PROBLEMS: ICD-10-CM

## 2021-12-21 DIAGNOSIS — F17.210 CIGARETTE SMOKER: Primary | ICD-10-CM

## 2022-01-17 ENCOUNTER — APPOINTMENT (OUTPATIENT)
Dept: MRI IMAGING | Facility: HOSPITAL | Age: 62
End: 2022-01-17

## 2022-01-17 ENCOUNTER — APPOINTMENT (OUTPATIENT)
Dept: CT IMAGING | Facility: HOSPITAL | Age: 62
End: 2022-01-17

## 2022-02-07 ENCOUNTER — APPOINTMENT (OUTPATIENT)
Dept: CT IMAGING | Facility: HOSPITAL | Age: 62
End: 2022-02-07

## 2022-02-07 ENCOUNTER — APPOINTMENT (OUTPATIENT)
Dept: MRI IMAGING | Facility: HOSPITAL | Age: 62
End: 2022-02-07

## 2022-02-28 ENCOUNTER — HOSPITAL ENCOUNTER (OUTPATIENT)
Dept: CT IMAGING | Facility: HOSPITAL | Age: 62
Discharge: HOME | End: 2022-02-28
Admitting: FAMILY MEDICINE

## 2022-02-28 DIAGNOSIS — F17.210 CIGARETTE SMOKER: ICD-10-CM

## 2022-02-28 DIAGNOSIS — R26.89 BALANCE PROBLEMS: ICD-10-CM

## 2022-02-28 PROCEDURE — 71271 CT THORAX LUNG CANCER SCR C-: CPT

## 2022-03-28 ENCOUNTER — OFFICE VISIT (OUTPATIENT)
Dept: PULMONOLOGY | Facility: CLINIC | Age: 62
End: 2022-03-28

## 2022-03-28 ENCOUNTER — NURSE NAVIGATOR (OUTPATIENT)
Dept: PULMONOLOGY | Facility: CLINIC | Age: 62
End: 2022-03-28

## 2022-03-28 VITALS
RESPIRATION RATE: 18 BRPM | HEIGHT: 62 IN | TEMPERATURE: 97.8 F | DIASTOLIC BLOOD PRESSURE: 81 MMHG | OXYGEN SATURATION: 96 % | WEIGHT: 224 LBS | SYSTOLIC BLOOD PRESSURE: 112 MMHG | BODY MASS INDEX: 41.22 KG/M2 | HEART RATE: 96 BPM

## 2022-03-28 DIAGNOSIS — J44.9 COPD MIXED TYPE: ICD-10-CM

## 2022-03-28 DIAGNOSIS — Z01.818 OTHER SPECIFIED PRE-OPERATIVE EXAMINATION: Primary | ICD-10-CM

## 2022-03-28 DIAGNOSIS — R91.8 MULTIPLE PULMONARY NODULES: Primary | ICD-10-CM

## 2022-03-28 DIAGNOSIS — Z72.0 TOBACCO ABUSE: ICD-10-CM

## 2022-03-28 PROCEDURE — 99204 OFFICE O/P NEW MOD 45 MIN: CPT | Performed by: INTERNAL MEDICINE

## 2022-03-28 RX ORDER — BUDESONIDE, GLYCOPYRROLATE, AND FORMOTEROL FUMARATE 160; 9; 4.8 UG/1; UG/1; UG/1
2 AEROSOL, METERED RESPIRATORY (INHALATION) 2 TIMES DAILY
Qty: 1 EACH | Refills: 11 | Status: SHIPPED | OUTPATIENT
Start: 2022-03-28 | End: 2022-04-05 | Stop reason: ALTCHOICE

## 2022-03-28 NOTE — PROGRESS NOTES
Nurse Navigator met with patient and family on this date.  Pt is seen in new consultation with Dr. Borja.  Pt was referred to lung nodule clinic following an abnormal chest CT revealed Right lung nodules.  Dr. Borja reviewed Lung Nodule Sheet and Lung RADS scoring with patient and family (printed copies of both also given).  Dr. Borja's plan and recommendation is to prescribe a new maintenance inhaler prescription, to repeat a 3 month chest CT scan (at the end of May 2022) and to follow up in C soon after, and to encourage patient in smoking cessation.  The role of the NN introduced to patient.  NN contact information provided and encouraged for pt/family to call with any questions or concerns.  Pt verbalized understanding and is in agreement with plan.  NN will follow and assist prn.

## 2022-03-28 NOTE — PROGRESS NOTES
PULMONARY  NOTE    Chief Complaint     Abnormal CT scan of the chest, tobacco abuse, COPD, shortness of breath    History of Present Illness     61-year-old male referred for evaluation of an abnormal CT scan of the chest    He underwent an LDCT in February  This was his baseline screening scan  He thinks he may have had a CAT scan in [place] in 2007 but is not sure  This revealed several pulmonary nodules as noted below    He has a long history of tobacco abuse, greater than 50 years  He has smoked up to 2 packs of cigarettes per day  He knows that he needs to stop smoking but has not set up any plans for smoking cessation    He has dyspnea on exertion, not at rest  He gets short of breath bending over or walking up much less than 1 flight of stairs  He has Symbicort and Ventolin  He feels that the inhalers help with the shortness of breath    He has a daily cough, typically in the morning  He has not had hemoptysis    He has not had a recent respiratory tract infection.    Patient Active Problem List   Diagnosis   • Coronary artery disease involving native coronary artery of native heart with angina pectoris (HCC)   • Essential hypertension   • Hyperlipidemia LDL goal <70   • Abdominal aortic aneurysm (AAA) without rupture (HCC)   • Multiple pulmonary nodules (Max 8mm)   • Tobacco abuse   • COPD      Allergies   Allergen Reactions   • Codeine Itching       Current Outpatient Medications:   •  aspirin 81 MG EC tablet, Take 1 tablet by mouth Daily., Disp: 30 tablet, Rfl: 11  •  atorvastatin (LIPITOR) 40 MG tablet, Take 1 tablet by mouth Daily., Disp: 30 tablet, Rfl: 2  •  cetirizine (zyrTEC) 10 MG tablet, Take 10 mg by mouth Daily., Disp: , Rfl:   •  clopidogrel (PLAVIX) 75 MG tablet, Take 1 tablet by mouth Daily., Disp: 30 tablet, Rfl: 11  •  docusate sodium (COLACE) 100 MG capsule, Take 100 mg by mouth As Needed for Constipation., Disp: , Rfl:   •  gabapentin (NEURONTIN) 100 MG capsule, Take 100 mg by mouth 3  "(Three) Times a Day., Disp: , Rfl:   •  meclizine (ANTIVERT) 25 MG tablet, Take 25 mg by mouth 3 (Three) Times a Day As Needed for dizziness., Disp: , Rfl:   •  METOPROLOL TARTRATE PO, Take 12.5 mg by mouth 2 (Two) Times a Day., Disp: , Rfl:   •  montelukast (SINGULAIR) 10 MG tablet, Take 10 mg by mouth Every Night., Disp: , Rfl:   •  oxyCODONE (ROXICODONE) 15 MG immediate release tablet, Take 15 mg by mouth 4 (Four) Times a Day., Disp: , Rfl:   •  pantoprazole (PROTONIX) 40 MG EC tablet, Take 40 mg by mouth Daily., Disp: , Rfl:   •  ranolazine (RANEXA) 500 MG 12 hr tablet, TAKE 1 TABLET BY MOUTH 2 (TWO) TIMES A DAY., Disp: 60 tablet, Rfl: 0  MEDICATION LIST AND ALLERGIES REVIEWED.    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    Review of Systems  ALSO REFER TO SCANNED ROS SHEET FROM SAME DATE.    /81   Pulse 96   Temp 97.8 °F (36.6 °C)   Resp 18   Ht 157.5 cm (62\")   Wt 102 kg (224 lb)   SpO2 96%   BMI 40.97 kg/m²   Physical Exam  Vitals and nursing note reviewed.   Constitutional:       General: He is not in acute distress.     Appearance: He is well-developed. He is not diaphoretic.   HENT:      Head: Normocephalic and atraumatic.   Neck:      Thyroid: No thyromegaly.   Cardiovascular:      Rate and Rhythm: Normal rate and regular rhythm.      " Heart sounds: Normal heart sounds. No murmur heard.  Pulmonary:      Effort: Pulmonary effort is normal.      Breath sounds: Normal breath sounds. No stridor.      Comments: Decreased BS with a few wheezes  Chest:   Breasts:      Right: No supraclavicular adenopathy.      Left: No supraclavicular adenopathy.       Lymphadenopathy:      Cervical: No cervical adenopathy.      Upper Body:      Right upper body: No supraclavicular or epitrochlear adenopathy.      Left upper body: No supraclavicular or epitrochlear adenopathy.   Skin:     General: Skin is warm and dry.   Neurological:      Mental Status: He is alert and oriented to person, place, and time.   Psychiatric:         Behavior: Behavior normal.         Results     LDCT from reviewed on PACS  Two ellipsoid right sided pulmonary nodules noted with the largest about 8mm by my measurement     There is no immunization history on file for this patient.  Problem List       ICD-10-CM ICD-9-CM   1. Multiple pulmonary nodules (Max 8mm)  R91.8 793.19   2. Tobacco abuse  Z72.0 305.1   3. COPD   J44.9 496       Discussion     We reviewed his chest imaging  This is his first/baseline LDCT  He has 2 pulmonary nodules, largest of which is about 8 mm in size  Unfortunately, no other chest imaging available for comparison  Therefore, according to lung RADS criteria, he is a category 4A.  I would not recommend a PET scan at this point and these lesions would be extremely difficult to biopsy.  Therefore, I recommended a short interval follow-up CT scan of the chest in 3 months  We will arrange the CT scan    He has COPD, probably at least moderate to severe  We discussed the need for smoking cessation and we discussed smoking cessation strategies    We will change his Symbicort to Breztri if it appears that insurance will cover it.    I will plan to see him back in 3 months for follow-up  We will get pulmonary function test prior to his return    Moderate level of Medical  Decision Making complexity based on 1 undiagnosed new problem, independent interpretation of tests, and/or prescription drug management     Enrique Borja MD  Note electronically signed    CC: Chante Wei MD

## 2022-04-05 ENCOUNTER — OFFICE VISIT (OUTPATIENT)
Dept: CARDIOLOGY | Facility: CLINIC | Age: 62
End: 2022-04-05

## 2022-04-05 VITALS
SYSTOLIC BLOOD PRESSURE: 110 MMHG | DIASTOLIC BLOOD PRESSURE: 74 MMHG | TEMPERATURE: 97 F | HEIGHT: 62 IN | OXYGEN SATURATION: 95 % | WEIGHT: 209 LBS | HEART RATE: 59 BPM | BODY MASS INDEX: 38.46 KG/M2

## 2022-04-05 DIAGNOSIS — I25.10 CORONARY ARTERY DISEASE DUE TO LIPID RICH PLAQUE: Primary | ICD-10-CM

## 2022-04-05 DIAGNOSIS — I25.83 CORONARY ARTERY DISEASE DUE TO LIPID RICH PLAQUE: Primary | ICD-10-CM

## 2022-04-05 PROCEDURE — 93000 ELECTROCARDIOGRAM COMPLETE: CPT | Performed by: PHYSICIAN ASSISTANT

## 2022-04-05 PROCEDURE — 99214 OFFICE O/P EST MOD 30 MIN: CPT | Performed by: PHYSICIAN ASSISTANT

## 2022-04-05 RX ORDER — ALBUTEROL SULFATE 90 UG/1
2 AEROSOL, METERED RESPIRATORY (INHALATION) EVERY 4 HOURS PRN
COMMUNITY

## 2022-04-05 RX ORDER — DIPHENOXYLATE HYDROCHLORIDE AND ATROPINE SULFATE 2.5; .025 MG/1; MG/1
TABLET ORAL DAILY
COMMUNITY

## 2022-04-05 RX ORDER — NITROGLYCERIN 0.4 MG/1
0.4 TABLET SUBLINGUAL
COMMUNITY

## 2022-04-05 RX ORDER — ALBUTEROL SULFATE 2.5 MG/3ML
2.5 SOLUTION RESPIRATORY (INHALATION) EVERY 4 HOURS PRN
COMMUNITY

## 2022-04-05 RX ORDER — ATORVASTATIN CALCIUM 80 MG/1
80 TABLET, FILM COATED ORAL DAILY
Qty: 30 TABLET | Refills: 2 | Status: SHIPPED | OUTPATIENT
Start: 2022-04-05

## 2022-04-05 RX ORDER — LOSARTAN POTASSIUM 25 MG/1
12.5 TABLET ORAL DAILY
Qty: 45 TABLET | Refills: 3 | Status: SHIPPED | OUTPATIENT
Start: 2022-04-05

## 2022-04-05 NOTE — PROGRESS NOTES
Chante Wei MD  Na Laguerre  1960 04/05/2022    Patient Active Problem List   Diagnosis   • Coronary artery disease involving native coronary artery of native heart with angina pectoris (HCC)   • Essential hypertension   • Hyperlipidemia LDL goal <70   • Abdominal aortic aneurysm (AAA) without rupture (HCC)   • Multiple pulmonary nodules (Max 8mm)   • Tobacco abuse   • COPD        Dear Chante Wei MD:    Subjective     History of Present Illness:    Chief Complaint   Patient presents with   • Med Management     List.    • Shortness of Breath   • Edema       Na Laguerre is a pleasant 61 y.o. male with a past medical history significant for ASCVD S/P PCI in 2015, essential hypertension, dyslipidemia, COPD 2/2 to tobacco abuse. He comes in today for routine cardiology follow up.     Mr. Laguerre has been lost to follow up since 11/18/2019, having canceled or no showed several previous appointments. He comes in today following the advise of his pcp after CT scan of the chest showed severe coronary calcifications. Clinically he is completely asymptomatic. He denies any chest pains whatsoever, shortness of breath that is worse that his base line. He also denies any palpitations, dizziness, or syncope.     Allergies   Allergen Reactions   • Codeine Itching   :      Current Outpatient Medications:   •  albuterol (PROVENTIL) (2.5 MG/3ML) 0.083% nebulizer solution, Take 2.5 mg by nebulization Every 4 (Four) Hours As Needed for Wheezing., Disp: , Rfl:   •  albuterol sulfate  (90 Base) MCG/ACT inhaler, Inhale 2 puffs Every 4 (Four) Hours As Needed for Wheezing., Disp: , Rfl:   •  atorvastatin (LIPITOR) 80 MG tablet, Take 1 tablet by mouth Daily., Disp: 30 tablet, Rfl: 2  •  Budeson-Glycopyrrol-Formoterol (BREZTRI) 160-9-4.8 MCG/ACT aerosol inhaler, Inhale 2 puffs 2 (Two) Times a Day., Disp: , Rfl:   •  cetirizine (zyrTEC) 10 MG tablet, Take 10 mg by mouth Daily., Disp: , Rfl:   •   "clopidogrel (PLAVIX) 75 MG tablet, Take 1 tablet by mouth Daily., Disp: 30 tablet, Rfl: 11  •  gabapentin (NEURONTIN) 100 MG capsule, Take 100 mg by mouth 3 (Three) Times a Day., Disp: , Rfl:   •  meclizine (ANTIVERT) 25 MG tablet, Take 25 mg by mouth 3 (Three) Times a Day As Needed for dizziness., Disp: , Rfl:   •  METOPROLOL TARTRATE PO, Take 12.5 mg by mouth 2 (Two) Times a Day., Disp: , Rfl:   •  montelukast (SINGULAIR) 10 MG tablet, Take 10 mg by mouth Every Night., Disp: , Rfl:   •  multivitamin (MULTI-DAY VITAMINS PO), Take  by mouth Daily., Disp: , Rfl:   •  nitroglycerin (NITROSTAT) 0.4 MG SL tablet, Place 0.4 mg under the tongue Every 5 (Five) Minutes As Needed for Chest Pain. Take no more than 3 doses in 15 minutes., Disp: , Rfl:   •  oxyCODONE (ROXICODONE) 15 MG immediate release tablet, Take 15 mg by mouth 4 (Four) Times a Day., Disp: , Rfl:   •  pantoprazole (PROTONIX) 40 MG EC tablet, Take 40 mg by mouth Daily., Disp: , Rfl:   •  losartan (Cozaar) 25 MG tablet, Take 0.5 tablets by mouth Daily., Disp: 45 tablet, Rfl: 3  •  ranolazine (RANEXA) 500 MG 12 hr tablet, TAKE 1 TABLET BY MOUTH 2 (TWO) TIMES A DAY., Disp: 60 tablet, Rfl: 0    The following portions of the patient's history were reviewed and updated as appropriate: allergies, current medications, past family history, past medical history, past social history, past surgical history and problem list.    Social History     Tobacco Use   • Smoking status: Current Every Day Smoker     Packs/day: 2.00     Years: 35.00     Pack years: 70.00     Types: Cigarettes   • Smokeless tobacco: Never Used   Substance Use Topics   • Alcohol use: No   • Drug use: No         Objective   Vitals:    04/05/22 1142   BP: 110/74   BP Location: Right arm   Patient Position: Sitting   Cuff Size: Adult   Pulse: 59   Temp: 97 °F (36.1 °C)   TempSrc: Infrared   SpO2: 95%   Weight: 94.8 kg (209 lb)   Height: 157.5 cm (62\")     Body mass index is 38.23 " kg/m².    Constitutional:       General: Not in acute distress.     Appearance: Healthy appearance. Well-developed and not in distress. Not diaphoretic.   Eyes:      Conjunctiva/sclera: Conjunctivae normal.      Pupils: Pupils are equal, round, and reactive to light.   HENT:      Head: Normocephalic and atraumatic.   Neck:      Vascular: No carotid bruit or JVD.   Pulmonary:      Effort: Pulmonary effort is normal. No respiratory distress.      Breath sounds: Normal breath sounds.   Cardiovascular:      Normal rate. Regular rhythm.   Skin:     General: Skin is cool.   Neurological:      Mental Status: Alert, oriented to person, place, and time and oriented to person, place and time.         No results found for: NA, K, CL, CO2, BUN, CREATININE, LABGLOM, GLUCOSE, CALCIUM, AST, ALT, ALKPHOS, LABIL2  No results found for: CKTOTAL  No results found for: WBC, HGB, HCT, PLT  No results found for: INR  No results found for: MG  No results found for: TSH, PSA, CHLPL, TRIG, HDL, LDL   No results found for: BNP    During this visit the following were done:  Labs Reviewed []    Labs Ordered []    Radiology Reports Reviewed []    Radiology Ordered []    PCP Records Reviewed []    Referring Provider Records Reviewed []    ER Records Reviewed []    Hospital Records Reviewed []    History Obtained From Family []    Radiology Images Reviewed []    Other Reviewed []    Records Requested []         ECG 12 Lead    Date/Time: 4/5/2022 11:38 AM  Performed by: Zackery Sofia PA-C  Authorized by: Zackery Sofia PA-C   Comparison: compared with previous ECG   Similar to previous ECG  Rhythm: sinus rhythm  Conduction: conduction normal  ST Segments: ST segments normal    Clinical impression: normal ECG            Assessment/Plan    Diagnosis Plan   1. Coronary artery disease due to lipid rich plaque  Comprehensive Metabolic Panel            Recommendations:  1. CAD s/p stenting  1. Reviewed CT scan of chest that showed coronary artery  calcifications. patient does have known CAD with previous stenting, thus calcifications are expected. He is completely asymptomatic at this time so will continue with GDMT at this time.   2. I will start him on losartan 12.5 mg daily.   3. Continue lipitor, plavix, metoprolol  2. Dyslipidemia   1. His cholesterol is elevated from pcp's office. I will increase lipitor to 80 mg daily and recheck CMP and lipid panel in a month      Return in about 3 months (around 7/5/2022).    As always, I appreciate very much the opportunity to participate in the cardiovascular care of your patients.      With Best Regards,    Zackery Sofia PA-C

## 2022-04-06 ENCOUNTER — TELEPHONE (OUTPATIENT)
Dept: CARDIOLOGY | Facility: CLINIC | Age: 62
End: 2022-04-06

## 2022-04-06 NOTE — TELEPHONE ENCOUNTER
----- Message from Zackery Sofia PA-C sent at 4/5/2022  3:20 PM EDT -----  Please let him know that after viewing labs from pcp his choelsterol is elevated above goal so I want him to increase his lipitor to 80 mg daily.       Patient's girlfriend informed per [employer].  She verbalized understanding.

## 2022-04-07 ENCOUNTER — APPOINTMENT (OUTPATIENT)
Dept: MRI IMAGING | Facility: HOSPITAL | Age: 62
End: 2022-04-07

## 2022-04-18 ENCOUNTER — APPOINTMENT (OUTPATIENT)
Dept: RESPIRATORY THERAPY | Facility: HOSPITAL | Age: 62
End: 2022-04-18

## 2022-04-19 ENCOUNTER — APPOINTMENT (OUTPATIENT)
Dept: RESPIRATORY THERAPY | Facility: HOSPITAL | Age: 62
End: 2022-04-19

## 2022-04-20 ENCOUNTER — APPOINTMENT (OUTPATIENT)
Dept: RESPIRATORY THERAPY | Facility: HOSPITAL | Age: 62
End: 2022-04-20

## 2022-04-28 ENCOUNTER — APPOINTMENT (OUTPATIENT)
Dept: MRI IMAGING | Facility: HOSPITAL | Age: 62
End: 2022-04-28

## 2022-05-04 ENCOUNTER — HOSPITAL ENCOUNTER (OUTPATIENT)
Dept: MRI IMAGING | Facility: HOSPITAL | Age: 62
Discharge: HOME | End: 2022-05-04
Admitting: FAMILY MEDICINE

## 2022-05-04 DIAGNOSIS — R26.89 BALANCE PROBLEMS: ICD-10-CM

## 2022-05-04 DIAGNOSIS — F17.210 CIGARETTE SMOKER: ICD-10-CM

## 2022-05-04 LAB — CREAT BLDA-MCNC: 1.6 MG/DL (ref 0.6–1.3)

## 2022-05-04 PROCEDURE — 0 GADOBENATE DIMEGLUMINE 529 MG/ML SOLUTION: Performed by: FAMILY MEDICINE

## 2022-05-04 PROCEDURE — 0 GADOBENATE DIMEGLUMINE 529 MG/ML SOLUTION

## 2022-05-04 PROCEDURE — A9577 INJ MULTIHANCE: HCPCS

## 2022-05-04 PROCEDURE — 70553 MRI BRAIN STEM W/O & W/DYE: CPT

## 2022-05-04 PROCEDURE — A9577 INJ MULTIHANCE: HCPCS | Performed by: FAMILY MEDICINE

## 2022-05-04 PROCEDURE — 70553 MRI BRAIN STEM W/O & W/DYE: CPT | Performed by: RADIOLOGY

## 2022-05-04 PROCEDURE — 82565 ASSAY OF CREATININE: CPT

## 2022-05-04 RX ADMIN — GADOBENATE DIMEGLUMINE 19 ML: 529 INJECTION, SOLUTION INTRAVENOUS at 10:51

## 2022-05-27 ENCOUNTER — HOSPITAL ENCOUNTER (OUTPATIENT)
Dept: CT IMAGING | Facility: HOSPITAL | Age: 62
Discharge: HOME | End: 2022-05-27
Admitting: INTERNAL MEDICINE

## 2022-05-27 DIAGNOSIS — J44.9 COPD MIXED TYPE: ICD-10-CM

## 2022-05-27 DIAGNOSIS — R91.8 MULTIPLE PULMONARY NODULES: ICD-10-CM

## 2022-05-27 DIAGNOSIS — Z72.0 TOBACCO ABUSE: ICD-10-CM

## 2022-05-27 PROCEDURE — 71250 CT THORAX DX C-: CPT

## 2022-05-27 PROCEDURE — 71250 CT THORAX DX C-: CPT | Performed by: RADIOLOGY

## 2022-09-06 ENCOUNTER — TELEPHONE (OUTPATIENT)
Dept: PULMONOLOGY | Facility: CLINIC | Age: 62
End: 2022-09-06

## 2022-09-06 NOTE — TELEPHONE ENCOUNTER
Fax refill request for Rx Breztri denied at this time. Pt will need to schedule an apt for further refills.

## 2023-03-30 RX ORDER — BUDESONIDE, GLYCOPYRROLATE, AND FORMOTEROL FUMARATE 160; 9; 4.8 UG/1; UG/1; UG/1
AEROSOL, METERED RESPIRATORY (INHALATION)
Qty: 10.7 G | OUTPATIENT
Start: 2023-03-30

## 2023-05-26 ENCOUNTER — TRANSCRIBE ORDERS (OUTPATIENT)
Dept: ADMINISTRATIVE | Facility: HOSPITAL | Age: 63
End: 2023-05-26

## 2023-05-26 DIAGNOSIS — R91.1 LUNG NODULE: Primary | ICD-10-CM

## 2023-05-30 ENCOUNTER — TRANSCRIBE ORDERS (OUTPATIENT)
Dept: ADMINISTRATIVE | Facility: HOSPITAL | Age: 63
End: 2023-05-30

## 2023-05-30 DIAGNOSIS — R91.8 LUNG MASS: Primary | ICD-10-CM

## 2024-02-14 ENCOUNTER — TELEPHONE (OUTPATIENT)
Dept: CARDIOLOGY | Facility: CLINIC | Age: 64
End: 2024-02-14
Payer: MEDICARE

## 2024-02-15 DIAGNOSIS — I25.10 CORONARY ARTERY DISEASE DUE TO LIPID RICH PLAQUE: Primary | ICD-10-CM

## 2024-02-15 DIAGNOSIS — I25.83 CORONARY ARTERY DISEASE DUE TO LIPID RICH PLAQUE: Primary | ICD-10-CM

## 2024-07-30 ENCOUNTER — TRANSCRIBE ORDERS (OUTPATIENT)
Dept: ADMINISTRATIVE | Facility: HOSPITAL | Age: 64
End: 2024-07-30
Payer: MEDICARE

## 2024-07-30 DIAGNOSIS — M54.2 CERVICALGIA: Primary | ICD-10-CM

## 2024-07-30 DIAGNOSIS — M54.16 LUMBAR RADICULOPATHY: ICD-10-CM

## 2024-08-12 ENCOUNTER — OFFICE VISIT (OUTPATIENT)
Dept: CARDIOLOGY | Facility: CLINIC | Age: 64
End: 2024-08-12
Payer: MEDICARE

## 2024-08-12 VITALS
BODY MASS INDEX: 34.52 KG/M2 | HEIGHT: 65 IN | WEIGHT: 207.2 LBS | OXYGEN SATURATION: 95 % | HEART RATE: 68 BPM | SYSTOLIC BLOOD PRESSURE: 107 MMHG | DIASTOLIC BLOOD PRESSURE: 74 MMHG

## 2024-08-12 DIAGNOSIS — I25.119 CORONARY ARTERY DISEASE INVOLVING NATIVE CORONARY ARTERY OF NATIVE HEART WITH ANGINA PECTORIS: Primary | ICD-10-CM

## 2024-08-12 DIAGNOSIS — R07.2 PRECORDIAL PAIN: ICD-10-CM

## 2024-08-12 DIAGNOSIS — I71.40 ABDOMINAL AORTIC ANEURYSM (AAA) WITHOUT RUPTURE, UNSPECIFIED PART: ICD-10-CM

## 2024-08-12 DIAGNOSIS — I10 ESSENTIAL HYPERTENSION: ICD-10-CM

## 2024-08-12 PROCEDURE — 3078F DIAST BP <80 MM HG: CPT | Performed by: PHYSICIAN ASSISTANT

## 2024-08-12 PROCEDURE — 99214 OFFICE O/P EST MOD 30 MIN: CPT | Performed by: PHYSICIAN ASSISTANT

## 2024-08-12 PROCEDURE — 3074F SYST BP LT 130 MM HG: CPT | Performed by: PHYSICIAN ASSISTANT

## 2024-08-12 PROCEDURE — 93000 ELECTROCARDIOGRAM COMPLETE: CPT | Performed by: PHYSICIAN ASSISTANT

## 2024-08-12 RX ORDER — DIPHENHYDRAMINE HCL 25 MG
25 CAPSULE ORAL 2 TIMES DAILY
COMMUNITY

## 2024-08-12 RX ORDER — NITROGLYCERIN 0.4 MG/1
0.4 TABLET SUBLINGUAL
Qty: 100 TABLET | Refills: 2 | Status: SHIPPED | OUTPATIENT
Start: 2024-08-12

## 2024-08-12 RX ORDER — OMEGA-3S/DHA/EPA/FISH OIL/D3 300MG-1000
400 CAPSULE ORAL DAILY
COMMUNITY

## 2024-08-12 RX ORDER — PRAMIPEXOLE DIHYDROCHLORIDE 0.25 MG/1
1 TABLET ORAL 3 TIMES DAILY
COMMUNITY
Start: 2024-07-08

## 2024-08-12 NOTE — PROGRESS NOTES
Chante Wei MD  Na Laguerre  1960 08/12/2024    Patient Active Problem List   Diagnosis    Coronary artery disease involving native coronary artery of native heart with angina pectoris    Essential hypertension    Hyperlipidemia LDL goal <70    Abdominal aortic aneurysm (AAA) without rupture    Multiple pulmonary nodules (Max 8mm)    Tobacco abuse    COPD        Dear Chante Wei MD:    Subjective     Follow-upCurrent symptoms: dizziness and shortness of breath.   Shortness of Breath    Dizziness    :    Chief Complaint   Patient presents with    Follow-up     routine    Shortness of Breath     On exertion    Dizziness       Na Laguerre is a pleasant 63 y.o. male with a past medical history significant for ASCVD S/P PCI in 2015, essential hypertension, dyslipidemia, COPD 2/2 to tobacco abuse. He comes in today for routine cardiology follow up.     Mr. Laguerre comes in today after being lost to follow-up since April 2022.  He comes in today for medication refill/routine follow-up.  Upon further questioning he does admit he has been having some pains in his upper back that has been occurring about once a month.  He reports that this seems to come on at random with no relation to physical exertion.  However, he admits he has been fairly sedentary other than mowing his lawn with a riding lawnmower he has not been doing much other physical exertion.  He does state back in 2015 when he received his PCI he was having similar pains then as well.  He does report that this pain will persist until he takes at least 1 sublingual nitroglycerin and at times has required 2.  He reports that he also develops diaphoresis and nausea when his pain is present.  Of note he states these episodes are typically occurring just once monthly.    Allergies   Allergen Reactions    Codeine Itching   :      Current Outpatient Medications:     albuterol (PROVENTIL) (2.5 MG/3ML) 0.083% nebulizer solution, Take 2.5  mg by nebulization Every 4 (Four) Hours As Needed for Wheezing., Disp: , Rfl:     albuterol sulfate  (90 Base) MCG/ACT inhaler, Inhale 2 puffs Every 4 (Four) Hours As Needed for Wheezing., Disp: , Rfl:     atorvastatin (LIPITOR) 80 MG tablet, Take 1 tablet by mouth Daily., Disp: 30 tablet, Rfl: 2    Budeson-Glycopyrrol-Formoterol (BREZTRI) 160-9-4.8 MCG/ACT aerosol inhaler, Inhale 2 puffs 2 (Two) Times a Day., Disp: , Rfl:     Cholecalciferol 10 MCG (400 UNIT) tablet, Take 1 tablet by mouth Daily., Disp: , Rfl:     clopidogrel (PLAVIX) 75 MG tablet, Take 1 tablet by mouth Daily., Disp: 30 tablet, Rfl: 11    diphenhydrAMINE (BENADRYL) 25 mg capsule, Take 1 capsule by mouth 2 (Two) Times a Day., Disp: , Rfl:     gabapentin (NEURONTIN) 100 MG capsule, Take 1 capsule by mouth 3 (Three) Times a Day., Disp: , Rfl:     losartan (Cozaar) 25 MG tablet, Take 0.5 tablets by mouth Daily., Disp: 45 tablet, Rfl: 3    meclizine (ANTIVERT) 25 MG tablet, Take 1 tablet by mouth 3 (Three) Times a Day As Needed for Dizziness., Disp: , Rfl:     METOPROLOL TARTRATE PO, Take 12.5 mg by mouth 2 (Two) Times a Day., Disp: , Rfl:     nitroglycerin (NITROSTAT) 0.4 MG SL tablet, Place 1 tablet under the tongue Every 5 (Five) Minutes As Needed for Chest Pain. Take no more than 3 doses in 15 minutes., Disp: 100 tablet, Rfl: 2    oxyCODONE (ROXICODONE) 15 MG immediate release tablet, Take 1 tablet by mouth 4 (Four) Times a Day., Disp: , Rfl:     pantoprazole (PROTONIX) 40 MG EC tablet, Take 1 tablet by mouth Daily., Disp: , Rfl:     pramipexole (MIRAPEX) 0.25 MG tablet, Take 1 tablet by mouth 3 times a day., Disp: , Rfl:     cetirizine (zyrTEC) 10 MG tablet, Take 10 mg by mouth Daily. (Patient not taking: Reported on 8/12/2024), Disp: , Rfl:     montelukast (SINGULAIR) 10 MG tablet, Take 10 mg by mouth Every Night. (Patient not taking: Reported on 8/12/2024), Disp: , Rfl:     multivitamin (MULTI-DAY VITAMINS PO), Take  by mouth Daily.  "(Patient not taking: Reported on 8/12/2024), Disp: , Rfl:     ranolazine (RANEXA) 500 MG 12 hr tablet, TAKE 1 TABLET BY MOUTH 2 (TWO) TIMES A DAY. (Patient not taking: Reported on 8/12/2024), Disp: 60 tablet, Rfl: 0    The following portions of the patient's history were reviewed and updated as appropriate: allergies, current medications, past family history, past medical history, past social history, past surgical history and problem list.    Social History     Tobacco Use    Smoking status: Every Day     Current packs/day: 2.00     Average packs/day: 2.0 packs/day for 35.0 years (70.0 ttl pk-yrs)     Types: Cigarettes     Passive exposure: Current    Smokeless tobacco: Never   Vaping Use    Vaping status: Never Used   Substance Use Topics    Alcohol use: No    Drug use: No         Objective   Vitals:    08/12/24 0913   BP: 107/74   Pulse: 68   SpO2: 95%   Weight: 94 kg (207 lb 3.2 oz)   Height: 165.1 cm (65\")     Body mass index is 34.48 kg/m².    Review of Systems   Respiratory:  Positive for shortness of breath.    Neurological:  Positive for dizziness.       Physical Exam    Lab Results   Component Value Date    CREATININE 1.60 (H) 05/04/2022     No results found for: \"CKTOTAL\"  No results found for: \"WBC\", \"HGB\", \"HCT\", \"PLT\"  No results found for: \"INR\"  No results found for: \"MG\"  No results found for: \"TSH\", \"PSA\", \"CHLPL\", \"TRIG\", \"HDL\", \"LDL\"   No results found for: \"BNP\"    During this visit the following were done:  Labs Reviewed []    Labs Ordered []    Radiology Reports Reviewed []    Radiology Ordered []    PCP Records Reviewed []    Referring Provider Records Reviewed []    ER Records Reviewed []    Hospital Records Reviewed []    History Obtained From Family []    Radiology Images Reviewed []    Other Reviewed []    Records Requested []         ECG 12 Lead    Date/Time: 8/12/2024 9:17 AM  Performed by: Zackery Sofia PA-C    Authorized by: Zackery Sofia PA-C  Comparison: compared with " previous ECG   Similar to previous ECG  Rhythm: sinus rhythm  ST Segments: ST segments normal    Clinical impression: normal ECG        Assessment & Plan    Diagnosis Plan   1. Coronary artery disease involving native coronary artery of native heart with angina pectoris  ECG 12 Lead    Stress Test With Myocardial Perfusion One Day    Adult Transthoracic Echo Complete W/ Cont if Necessary Per Protocol    CT Chest Without Contrast    Comprehensive Metabolic Panel    CBC (No Diff)    Lipid Panel    TSH      2. Essential hypertension  ECG 12 Lead    Stress Test With Myocardial Perfusion One Day    Adult Transthoracic Echo Complete W/ Cont if Necessary Per Protocol    CT Chest Without Contrast    Comprehensive Metabolic Panel    CBC (No Diff)    Lipid Panel    TSH      3. Precordial pain  Stress Test With Myocardial Perfusion One Day    Adult Transthoracic Echo Complete W/ Cont if Necessary Per Protocol    CT Chest Without Contrast    Comprehensive Metabolic Panel    CBC (No Diff)    Lipid Panel    TSH      4. Abdominal aortic aneurysm (AAA) without rupture, unspecified part  US aaa screen limited               Recommendations:  Anginal equivalent  His symptoms do seem angina equivalent to me.  These are symptoms similar to the symptoms he had in 2015 when he required PCI to the LAD.  I will order stress test and echocardiogram.  Will order Lexiscan sestamibi study due to baseline dyspnea with COPD  Lung nodule  He has known lung nodules has not had this repeated in a couple years will order CT of the chest.  Reportedly has abdominal aortic aneurysm  Order ultrasound to evaluate  Essential hypertension  Seems well-controlled will order routine labs with CMP, CBC, lipid panel, and TSH    No follow-ups on file.    As always, I appreciate very much the opportunity to participate in the cardiovascular care of your patients.      With Best Regards,    Zackery Sofia PA-C

## 2024-08-22 ENCOUNTER — HOSPITAL ENCOUNTER (OUTPATIENT)
Dept: MRI IMAGING | Facility: HOSPITAL | Age: 64
Discharge: HOME | End: 2024-08-22
Payer: MEDICARE

## 2024-08-22 DIAGNOSIS — M54.16 LUMBAR RADICULOPATHY: ICD-10-CM

## 2024-08-22 DIAGNOSIS — M54.2 CERVICALGIA: ICD-10-CM

## 2024-08-22 LAB — CREAT BLDA-MCNC: 1.2 MG/DL (ref 0.6–1.3)

## 2024-08-22 PROCEDURE — 0 GADOBENATE DIMEGLUMINE 529 MG/ML SOLUTION: Performed by: FAMILY MEDICINE

## 2024-08-22 PROCEDURE — 82565 ASSAY OF CREATININE: CPT

## 2024-08-22 PROCEDURE — 72158 MRI LUMBAR SPINE W/O & W/DYE: CPT

## 2024-08-22 PROCEDURE — A9577 INJ MULTIHANCE: HCPCS | Performed by: FAMILY MEDICINE

## 2024-08-22 PROCEDURE — 72156 MRI NECK SPINE W/O & W/DYE: CPT

## 2024-08-22 RX ADMIN — GADOBENATE DIMEGLUMINE 18 ML: 529 INJECTION, SOLUTION INTRAVENOUS at 12:33

## 2024-09-10 ENCOUNTER — HOSPITAL ENCOUNTER (OUTPATIENT)
Dept: NUCLEAR MEDICINE | Facility: HOSPITAL | Age: 64
End: 2024-09-10
Payer: MEDICARE

## 2024-09-10 ENCOUNTER — HOSPITAL ENCOUNTER (OUTPATIENT)
Dept: CT IMAGING | Facility: HOSPITAL | Age: 64
Discharge: HOME | End: 2024-09-10
Payer: MEDICARE

## 2024-09-10 ENCOUNTER — HOSPITAL ENCOUNTER (OUTPATIENT)
Dept: NUCLEAR MEDICINE | Facility: HOSPITAL | Age: 64
Discharge: HOME | End: 2024-09-10
Payer: MEDICARE

## 2024-09-10 ENCOUNTER — HOSPITAL ENCOUNTER (OUTPATIENT)
Dept: ULTRASOUND IMAGING | Facility: HOSPITAL | Age: 64
Discharge: HOME | End: 2024-09-10
Payer: MEDICARE

## 2024-09-10 ENCOUNTER — HOSPITAL ENCOUNTER (OUTPATIENT)
Dept: CARDIOLOGY | Facility: HOSPITAL | Age: 64
Discharge: HOME | End: 2024-09-10
Payer: MEDICARE

## 2024-09-10 ENCOUNTER — HOSPITAL ENCOUNTER (OUTPATIENT)
Dept: CARDIOLOGY | Facility: HOSPITAL | Age: 64
End: 2024-09-10
Payer: MEDICARE

## 2024-09-10 DIAGNOSIS — I25.119 CORONARY ARTERY DISEASE INVOLVING NATIVE CORONARY ARTERY OF NATIVE HEART WITH ANGINA PECTORIS: ICD-10-CM

## 2024-09-10 DIAGNOSIS — I10 ESSENTIAL HYPERTENSION: ICD-10-CM

## 2024-09-10 DIAGNOSIS — R07.2 PRECORDIAL PAIN: ICD-10-CM

## 2024-09-10 PROCEDURE — 93306 TTE W/DOPPLER COMPLETE: CPT | Performed by: INTERNAL MEDICINE

## 2024-09-10 PROCEDURE — 76706 US ABDL AORTA SCREEN AAA: CPT

## 2024-09-10 PROCEDURE — 71250 CT THORAX DX C-: CPT

## 2024-09-10 PROCEDURE — 93306 TTE W/DOPPLER COMPLETE: CPT

## 2024-09-11 LAB
BH CV ECHO MEAS - ACS: 1.6 CM
BH CV ECHO MEAS - AO MAX PG: 6 MMHG
BH CV ECHO MEAS - AO MEAN PG: 4 MMHG
BH CV ECHO MEAS - AO ROOT DIAM: 3.1 CM
BH CV ECHO MEAS - AO V2 MAX: 122 CM/SEC
BH CV ECHO MEAS - AO V2 VTI: 27.6 CM
BH CV ECHO MEAS - EDV(CUBED): 125 ML
BH CV ECHO MEAS - EDV(MOD-SP2): 34.7 ML
BH CV ECHO MEAS - EDV(MOD-SP4): 50.7 ML
BH CV ECHO MEAS - EF(MOD-BP): 51 %
BH CV ECHO MEAS - EF(MOD-SP2): 54.2 %
BH CV ECHO MEAS - EF(MOD-SP4): 50.7 %
BH CV ECHO MEAS - ESV(CUBED): 50.7 ML
BH CV ECHO MEAS - ESV(MOD-SP2): 15.9 ML
BH CV ECHO MEAS - ESV(MOD-SP4): 25 ML
BH CV ECHO MEAS - FS: 26 %
BH CV ECHO MEAS - IVS/LVPW: 1.14 CM
BH CV ECHO MEAS - IVSD: 0.8 CM
BH CV ECHO MEAS - LA DIMENSION: 3.7 CM
BH CV ECHO MEAS - LAT PEAK E' VEL: 11.2 CM/SEC
BH CV ECHO MEAS - LV DIASTOLIC VOL/BSA (35-75): 25.3 CM2
BH CV ECHO MEAS - LV MASS(C)D: 125.1 GRAMS
BH CV ECHO MEAS - LV SYSTOLIC VOL/BSA (12-30): 12.5 CM2
BH CV ECHO MEAS - LVIDD: 5 CM
BH CV ECHO MEAS - LVIDS: 3.7 CM
BH CV ECHO MEAS - LVOT AREA: 3.1 CM2
BH CV ECHO MEAS - LVOT DIAM: 2 CM
BH CV ECHO MEAS - LVPWD: 0.7 CM
BH CV ECHO MEAS - MED PEAK E' VEL: 7 CM/SEC
BH CV ECHO MEAS - MV A MAX VEL: 54.4 CM/SEC
BH CV ECHO MEAS - MV E MAX VEL: 73.3 CM/SEC
BH CV ECHO MEAS - MV E/A: 1.35
BH CV ECHO MEAS - PA ACC TIME: 0.14 SEC
BH CV ECHO MEAS - SV(MOD-SP2): 18.8 ML
BH CV ECHO MEAS - SV(MOD-SP4): 25.7 ML
BH CV ECHO MEAS - SVI(MOD-SP2): 9.4 ML/M2
BH CV ECHO MEAS - SVI(MOD-SP4): 12.8 ML/M2
BH CV ECHO MEAS - TAPSE (>1.6): 2.49 CM
BH CV ECHO MEASUREMENTS AVERAGE E/E' RATIO: 8.05

## 2024-09-16 ENCOUNTER — HOSPITAL ENCOUNTER (OUTPATIENT)
Dept: NUCLEAR MEDICINE | Facility: HOSPITAL | Age: 64
Discharge: HOME | End: 2024-09-16
Payer: MEDICARE

## 2024-09-16 ENCOUNTER — HOSPITAL ENCOUNTER (OUTPATIENT)
Dept: CARDIOLOGY | Facility: HOSPITAL | Age: 64
Discharge: HOME | End: 2024-09-16
Payer: MEDICARE

## 2024-09-16 PROCEDURE — A9500 TC99M SESTAMIBI: HCPCS | Performed by: PHYSICIAN ASSISTANT

## 2024-09-16 PROCEDURE — 0 TECHNETIUM SESTAMIBI: Performed by: PHYSICIAN ASSISTANT

## 2024-09-16 PROCEDURE — 78452 HT MUSCLE IMAGE SPECT MULT: CPT | Performed by: INTERNAL MEDICINE

## 2024-09-16 PROCEDURE — 25010000002 REGADENOSON 0.4 MG/5ML SOLUTION: Performed by: PHYSICIAN ASSISTANT

## 2024-09-16 PROCEDURE — 93017 CV STRESS TEST TRACING ONLY: CPT

## 2024-09-16 PROCEDURE — 93018 CV STRESS TEST I&R ONLY: CPT | Performed by: INTERNAL MEDICINE

## 2024-09-16 PROCEDURE — 78452 HT MUSCLE IMAGE SPECT MULT: CPT

## 2024-09-16 RX ORDER — REGADENOSON 0.08 MG/ML
0.4 INJECTION, SOLUTION INTRAVENOUS
Status: COMPLETED | OUTPATIENT
Start: 2024-09-16 | End: 2024-09-16

## 2024-09-16 RX ADMIN — TECHNETIUM TC 99M SESTAMIBI 1 DOSE: 1 INJECTION INTRAVENOUS at 09:35

## 2024-09-16 RX ADMIN — TECHNETIUM TC 99M SESTAMIBI 1 DOSE: 1 INJECTION INTRAVENOUS at 10:37

## 2024-09-16 RX ADMIN — REGADENOSON 0.4 MG: 0.08 INJECTION, SOLUTION INTRAVENOUS at 10:37

## 2024-09-17 LAB
BH CV REST NUCLEAR ISOTOPE DOSE: 9.2 MCI
BH CV STRESS COMMENTS STAGE 1: NORMAL
BH CV STRESS DOSE REGADENOSON STAGE 1: 0.4
BH CV STRESS DURATION MIN STAGE 1: 0
BH CV STRESS DURATION SEC STAGE 1: 10
BH CV STRESS NUCLEAR ISOTOPE DOSE: 29.7 MCI
BH CV STRESS PROTOCOL 1: NORMAL
BH CV STRESS RECOVERY BP: NORMAL MMHG
BH CV STRESS RECOVERY HR: 87 BPM
BH CV STRESS STAGE 1: 1
LV EF NUC BP: 63 %
MAXIMAL PREDICTED HEART RATE: 155 BPM
PERCENT MAX PREDICTED HR: 58.71 %
STRESS BASELINE BP: NORMAL MMHG
STRESS BASELINE HR: 61 BPM
STRESS PERCENT HR: 69 %
STRESS POST PEAK BP: NORMAL MMHG
STRESS POST PEAK HR: 91 BPM
STRESS TARGET HR: 132 BPM

## 2024-09-24 ENCOUNTER — OFFICE VISIT (OUTPATIENT)
Dept: CARDIOLOGY | Facility: CLINIC | Age: 64
End: 2024-09-24
Payer: MEDICARE

## 2024-09-24 VITALS
HEART RATE: 75 BPM | BODY MASS INDEX: 35.99 KG/M2 | SYSTOLIC BLOOD PRESSURE: 101 MMHG | DIASTOLIC BLOOD PRESSURE: 70 MMHG | OXYGEN SATURATION: 96 % | HEIGHT: 65 IN | WEIGHT: 216 LBS

## 2024-09-24 DIAGNOSIS — I25.119 CORONARY ARTERY DISEASE INVOLVING NATIVE CORONARY ARTERY OF NATIVE HEART WITH ANGINA PECTORIS: Primary | ICD-10-CM

## 2024-09-24 DIAGNOSIS — I10 ESSENTIAL HYPERTENSION: ICD-10-CM

## 2024-09-24 DIAGNOSIS — R07.2 PRECORDIAL PAIN: ICD-10-CM

## 2024-09-24 DIAGNOSIS — E78.5 HYPERLIPIDEMIA LDL GOAL <70: ICD-10-CM

## 2024-09-24 PROCEDURE — 3078F DIAST BP <80 MM HG: CPT | Performed by: PHYSICIAN ASSISTANT

## 2024-09-24 PROCEDURE — 99214 OFFICE O/P EST MOD 30 MIN: CPT | Performed by: PHYSICIAN ASSISTANT

## 2024-09-24 PROCEDURE — 3074F SYST BP LT 130 MM HG: CPT | Performed by: PHYSICIAN ASSISTANT

## 2024-09-30 ENCOUNTER — OFFICE VISIT (OUTPATIENT)
Dept: CARDIOLOGY | Facility: CLINIC | Age: 64
End: 2024-09-30
Payer: MEDICARE

## 2024-09-30 ENCOUNTER — PATIENT ROUNDING (BHMG ONLY) (OUTPATIENT)
Dept: CARDIOLOGY | Facility: CLINIC | Age: 64
End: 2024-09-30
Payer: MEDICARE

## 2024-09-30 VITALS
HEART RATE: 73 BPM | BODY MASS INDEX: 35.16 KG/M2 | WEIGHT: 211 LBS | SYSTOLIC BLOOD PRESSURE: 109 MMHG | DIASTOLIC BLOOD PRESSURE: 76 MMHG | HEIGHT: 65 IN | OXYGEN SATURATION: 96 %

## 2024-09-30 DIAGNOSIS — J44.9 COPD MIXED TYPE: ICD-10-CM

## 2024-09-30 DIAGNOSIS — I25.119 CORONARY ARTERY DISEASE INVOLVING NATIVE CORONARY ARTERY OF NATIVE HEART WITH ANGINA PECTORIS: Primary | ICD-10-CM

## 2024-09-30 DIAGNOSIS — Z72.0 TOBACCO ABUSE: ICD-10-CM

## 2024-09-30 DIAGNOSIS — I71.43 INFRARENAL ABDOMINAL AORTIC ANEURYSM (AAA) WITHOUT RUPTURE: ICD-10-CM

## 2024-09-30 DIAGNOSIS — I10 ESSENTIAL HYPERTENSION: ICD-10-CM

## 2024-09-30 PROCEDURE — 3074F SYST BP LT 130 MM HG: CPT | Performed by: INTERNAL MEDICINE

## 2024-09-30 PROCEDURE — 1159F MED LIST DOCD IN RCRD: CPT | Performed by: INTERNAL MEDICINE

## 2024-09-30 PROCEDURE — 3078F DIAST BP <80 MM HG: CPT | Performed by: INTERNAL MEDICINE

## 2024-09-30 PROCEDURE — 1160F RVW MEDS BY RX/DR IN RCRD: CPT | Performed by: INTERNAL MEDICINE

## 2024-09-30 PROCEDURE — 99214 OFFICE O/P EST MOD 30 MIN: CPT | Performed by: INTERNAL MEDICINE

## 2024-09-30 RX ORDER — NICOTINE 21 MG/24HR
1 PATCH, TRANSDERMAL 24 HOURS TRANSDERMAL EVERY 24 HOURS
Qty: 30 PATCH | Refills: 1 | Status: SHIPPED | OUTPATIENT
Start: 2024-09-30

## 2024-09-30 RX ORDER — RANOLAZINE 500 MG/1
500 TABLET, EXTENDED RELEASE ORAL 2 TIMES DAILY
Qty: 60 TABLET | Refills: 2 | Status: SHIPPED | OUTPATIENT
Start: 2024-09-30

## 2024-09-30 NOTE — PATIENT INSTRUCTIONS
1.Ranexa 500 mg po Bid.  2.Nicotine patch 21 mg daily.  3.Continue efforts at quitting smoking.  4.Call 911 if pain gets worse or is not relieved with sl nitro.  5.RTC in 1 month.

## 2024-09-30 NOTE — PROGRESS NOTES
Progress Note    Subjective     Na Laguerre is a 64 y.o. male who presents to day for chest pain.      Active Problems:  Problem List Items Addressed This Visit          Cardiac and Vasculature    Coronary artery disease involving native coronary artery of native heart with angina pectoris - Primary    Relevant Medications    ranolazine (Ranexa) 500 MG 12 hr tablet    Essential hypertension    Abdominal aortic aneurysm (AAA) without rupture       Pulmonary and Pneumonias    COPD        Tobacco    Tobacco abuse       HPI  Na Laguerre is a 64-year-old male who was referred by Malcolm Sofia PA-C for further evaluation of his angina.  He has been having upper back pain that is resolved with nitroglycerin although long-acting nitroglycerin causes headaches.  He underwent a stress test that showed small ischemia in the inferior wall.  He states when he had a heart attack in  he had a similar kind of pain but that was persistent for 2 days and ended up being a heart attack.  He said he had 2 stents placed at that time.  He did have a repeat angiogram on 2018 and was noted to have 50% mid LAD and 50% proximal RCA stenosis but no mention of stents.  He also gets shortness of breath at times.  The chest pain is not precipitated at rest.  And also similar activities may not reproduce the chest pain.  He gets them about once maybe twice a month.  It last for less than 5 minutes.  He was prescribed Ranexa but has not been taking it.  He also complains of leg pain/cramps right greater than left after walking about 100 to 200 feet.    His other past medical history significant for abdominal aortic aneurysm measuring 3.3 cm on ultrasound 2024, hyperlipidemia, hypertension, pulmonary nodules and COPD.    Social history he is with his significant other.  Smokes 2 to 3 packs/day.  He denies alcohol or drug abuse.  He retired 15 years ago.  He used to be a steel .    Family history his mother  in her  70s from heart failure.  His father  in his 50s from lung disease.  He had 7 brothers and 6 of them passed away from cancer and diabetes mellitus complications.  He had 2 sisters 1  from cancer another from kidney disease.  He only has 1 brother left.          PRIOR MEDS  Current Outpatient Medications on File Prior to Visit   Medication Sig Dispense Refill    albuterol (PROVENTIL) (2.5 MG/3ML) 0.083% nebulizer solution Take 2.5 mg by nebulization Every 4 (Four) Hours As Needed for Wheezing.      albuterol sulfate  (90 Base) MCG/ACT inhaler Inhale 2 puffs Every 4 (Four) Hours As Needed for Wheezing.      atorvastatin (LIPITOR) 80 MG tablet Take 1 tablet by mouth Daily. 30 tablet 2    Budeson-Glycopyrrol-Formoterol (BREZTRI) 160-9-4.8 MCG/ACT aerosol inhaler Inhale 2 puffs 2 (Two) Times a Day.      cetirizine (zyrTEC) 10 MG tablet Take 1 tablet by mouth Daily.      Cholecalciferol 10 MCG (400 UNIT) tablet Take 1 tablet by mouth Daily.      clopidogrel (PLAVIX) 75 MG tablet Take 1 tablet by mouth Daily. 30 tablet 11    diphenhydrAMINE (BENADRYL) 25 mg capsule Take 1 capsule by mouth 2 (Two) Times a Day.      gabapentin (NEURONTIN) 100 MG capsule Take 1 capsule by mouth 3 (Three) Times a Day.      losartan (Cozaar) 25 MG tablet Take 0.5 tablets by mouth Daily. 45 tablet 3    METOPROLOL TARTRATE PO Take 12.5 mg by mouth 2 (Two) Times a Day.      nitroglycerin (NITROSTAT) 0.4 MG SL tablet Place 1 tablet under the tongue Every 5 (Five) Minutes As Needed for Chest Pain. Take no more than 3 doses in 15 minutes. 100 tablet 2    oxyCODONE (ROXICODONE) 15 MG immediate release tablet Take 1 tablet by mouth 4 (Four) Times a Day.      pantoprazole (PROTONIX) 40 MG EC tablet Take 1 tablet by mouth Daily.      pramipexole (MIRAPEX) 0.25 MG tablet Take 1 tablet by mouth 3 times a day.      meclizine (ANTIVERT) 25 MG tablet Take 1 tablet by mouth 3 (Three) Times a Day As Needed for Dizziness. (Patient not taking:  "Reported on 9/30/2024)      montelukast (SINGULAIR) 10 MG tablet Take 10 mg by mouth Every Night. (Patient not taking: Reported on 8/12/2024)      multivitamin (MULTI-DAY VITAMINS PO) Take  by mouth Daily. (Patient not taking: Reported on 8/12/2024)      [DISCONTINUED] ranolazine (RANEXA) 500 MG 12 hr tablet TAKE 1 TABLET BY MOUTH 2 (TWO) TIMES A DAY. (Patient not taking: Reported on 9/30/2024) 60 tablet 0     No current facility-administered medications on file prior to visit.       ALLERGIES  Codeine and Morphine    HISTORY  Past Medical History:   Diagnosis Date    GERD (gastroesophageal reflux disease)     Hyperlipidemia     Hypertension     Myocardial infarction        Social History     Socioeconomic History    Marital status:    Tobacco Use    Smoking status: Every Day     Current packs/day: 2.00     Average packs/day: 2.0 packs/day for 35.0 years (70.0 ttl pk-yrs)     Types: Cigarettes     Passive exposure: Current    Smokeless tobacco: Never   Vaping Use    Vaping status: Never Used   Substance and Sexual Activity    Alcohol use: No    Drug use: No    Sexual activity: Defer       Family History   Problem Relation Age of Onset    Heart disease Mother     Heart failure Mother     Emphysema Father     Cancer Sister     Heart attack Brother     Heart attack Brother     Diabetes Sister        Review of Systems    Objective     VITALS: /76 (BP Location: Right arm, Patient Position: Sitting, Cuff Size: Large Adult)   Pulse 73   Ht 165.1 cm (65\")   Wt 95.7 kg (211 lb)   SpO2 96%   BMI 35.11 kg/m²     LABS:   Hospital Outpatient Visit on 09/10/2024   Component Date Value Ref Range Status    BH CV REST NUCLEAR ISOTOPE DOSE 09/16/2024 9.2  mCi Final     CV STRESS NUCLEAR ISOTOPE DOSE 09/16/2024 29.7  mCi Final     CV STRESS PROTOCOL 1 09/16/2024 Pharmacologic   Final    Stage 1 09/16/2024 1.0   Final    Duration Min Stage 1 09/16/2024 0   Final    Duration Sec Stage 1 09/16/2024 10   Final    " Stress Dose Regadenoson Stage 1 09/16/2024 0.40   Final    Stress Comments Stage 1 09/16/2024 10 sec bolus injection   Final    Baseline HR 09/16/2024 61  bpm Final    Baseline BP 09/16/2024 141/92  mmHg Final    Peak HR 09/16/2024 91  bpm Final    Peak BP 09/16/2024 137/74  mmHg Final    Recovery HR 09/16/2024 87  bpm Final    Recovery BP 09/16/2024 126/75  mmHg Final    Target HR (85%) 09/16/2024 132  bpm Final    Max. Pred. HR (100%) 09/16/2024 155  bpm Final    Percent Max Pred HR 09/16/2024 58.71  % Final    Percent Target HR 09/16/2024 69  % Final    Nuc Stress EF 09/16/2024 63  % Final   Hospital Outpatient Visit on 09/10/2024   Component Date Value Ref Range Status    EF(MOD-bp) 09/10/2024 51.0  % Final    LVIDd 09/10/2024 5.0  cm Final    LVIDs 09/10/2024 3.7  cm Final    IVSd 09/10/2024 0.80  cm Final    LVPWd 09/10/2024 0.70  cm Final    FS 09/10/2024 26.0  % Final    IVS/LVPW 09/10/2024 1.14  cm Final    ESV(cubed) 09/10/2024 50.7  ml Final    LV Sys Vol (BSA corrected) 09/10/2024 12.5  cm2 Final    EDV(cubed) 09/10/2024 125.0  ml Final    LV Kim Vol (BSA corrected) 09/10/2024 25.3  cm2 Final    LV mass(C)d 09/10/2024 125.1  grams Final    LVOT area 09/10/2024 3.1  cm2 Final    LVOT diam 09/10/2024 2.00  cm Final    EDV(MOD-sp2) 09/10/2024 34.7  ml Final    EDV(MOD-sp4) 09/10/2024 50.7  ml Final    ESV(MOD-sp2) 09/10/2024 15.9  ml Final    ESV(MOD-sp4) 09/10/2024 25.0  ml Final    SV(MOD-sp2) 09/10/2024 18.8  ml Final    SV(MOD-sp4) 09/10/2024 25.7  ml Final    SVi(MOD-SP2) 09/10/2024 9.4  ml/m2 Final    SVi(MOD-SP4) 09/10/2024 12.8  ml/m2 Final    EF(MOD-sp2) 09/10/2024 54.2  % Final    EF(MOD-sp4) 09/10/2024 50.7  % Final    MV E max toni 09/10/2024 73.3  cm/sec Final    MV A max toni 09/10/2024 54.4  cm/sec Final    MV E/A 09/10/2024 1.35   Final    Med Peak E' Toni 09/10/2024 7.0  cm/sec Final    Lat Peak E' Toni 09/10/2024 11.2  cm/sec Final    Avg E/e' ratio 09/10/2024 8.05   Final    TAPSE (>1.6)  09/10/2024 2.49  cm Final    LA dimension (2D)  09/10/2024 3.7  cm Final    Ao pk katey 09/10/2024 122.0  cm/sec Final    Ao max PG 09/10/2024 6.0  mmHg Final    Ao mean PG 09/10/2024 4.0  mmHg Final    Ao V2 VTI 09/10/2024 27.6  cm Final    PA acc time 09/10/2024 0.14  sec Final    Ao root diam 09/10/2024 3.1  cm Final    ACS 09/10/2024 1.60  cm Final   Hospital Outpatient Visit on 08/22/2024   Component Date Value Ref Range Status    Creatinine 08/22/2024 1.20  0.60 - 1.30 mg/dL Final    Serial Number: [id number]:  [id number]         IMAGING:   CT Chest Without Contrast Diagnostic    Result Date: 9/10/2024  Stable subcentimeter noncalcified nodules in the right lower lobe which given the interval of no change are most certainly benign.   This report was finalized on 9/10/2024 9:04 AM by Regla Burger M.D..      US aaa screen limited    Result Date: 9/10/2024  3.3 cm abdominal aortic aneurysm.        This report was finalized on 9/10/2024 8:32 AM by Regla Burger M.D..      MRI Cervical Spine With & Without Contrast    Result Date: 8/22/2024  Multilevel degenerative change as detailed above.  This report was finalized on 8/22/2024 12:58 PM by Regla Burger M.D..      MRI Lumbar Spine With & Without Contrast    Result Date: 8/22/2024  Multilevel degenerative change without significant spinal stenosis or neural foraminal narrowing.   This report was finalized on 8/22/2024 12:56 PM by Regla Burger M.D..       No results found for this or any previous visit from the past 365 days.   Results for orders placed during the hospital encounter of 09/10/24    Stress Test With Myocardial Perfusion One Day    Interpretation Summary  Images from the original result were not included.      A pharmacological stress test was performed using regadenoson without low-level exercise.    Findings consistent with a normal ECG stress test.    Myocardial perfusion imaging indicates a small-sized, mild degree of ischemia  located in the inferior wall.    TID:1.14    Normal LV cavity size. Normal LV wall motion noted.    Left ventricular ejection fraction is normal (Calculated EF = 63%).    Impressions are consistent with an intermediate risk study.      Results for orders placed during the hospital encounter of 08/08/19    Stress Test With Myocardial Perfusion    Interpretation Summary  · A pharmacological stress test was performed using regadenoson  · Findings consistent with a normal ECG stress test.  · Myocardial perfusion imaging indicates a normal myocardial perfusion study with no evidence of ischemia.  · Normal LV cavity size. Normal LV wall motion noted.  · Left ventricular ejection fraction is normal (Calculated EF = 69%).  · Impressions are consistent with a low risk study.    Results for orders placed in visit on 08/12/24    US aaa screen limited    Narrative  PROCEDURE: US AAA SCREEN LIMITED-    HISTORY: known AAA; I71.40-Abdominal aortic aneurysm, without rupture,  unspecified    PROCEDURE: Ultrasound images of the abdominal aorta were obtained.    COMPARISON: None.    FINDINGS: The proximal aorta measures 3.3 cm. The mid aorta measures 2.5  cm. The distal aorta measures 2.9 cm.    Impression  3.3 cm abdominal aortic aneurysm.                This report was finalized on 9/10/2024 8:32 AM by Regla Burger M.D..      Results for orders placed during the hospital encounter of 08/08/19    US Carotid Bilateral    Narrative  EXAMINATION: US CAROTID BILATERAL-    Technique: Multiple real-time color Doppler images were acquired of  bilateral carotid arteries.    Stenosis measurements if obtained, were performed by the NASCET or  similar method.      CLINICAL INDICATION:     imbalance; R26.89-Other abnormalities of gait  and mobility    COMPARISON:    None    FINDINGS:    RIGHT:  No significant intimal thickening or plaque is noted. No occlusion.  RIGHT CCA PSV:1323 mm/s  RIGHT ICA PSV: -812.00 mm/s  RIGHT ICA EDV: -318.00  mm/s.  Right ICA/CCA Ratio: 0.61  Anterograde flow is demonstrated in RIGHT vertebral artery.    LEFT:  No significant intimal thickening or plaque is noted. No occlusion.  LEFT CCA PSV: 1134 mm/s  LEFT ICA PSV: -729.00 mm/s  LEFT EDV: -229.00 mm/s.  Left ICA/CCA Ratio: 0.64  Anterograde flow is demonstrated in LEFT vertebral artery.    Impression  1. No significant plaque. No occlusion.  2. No hemodynamically significant stenosis of either RIGHT or LEFT ICA.  3. Antegrade flow noted both vertebral arteries.    This report was finalized on 8/8/2019 9:13 AM by Dr. Santhosh Bruno MD.      EXAM:  Constitutional:       General: Awake.      Appearance: Healthy appearance. Not in distress.   HENT:      Head: Normocephalic and atraumatic.   Neck:      Vascular: No carotid bruit or JVD.   Pulmonary:      Breath sounds: Decreased air movement present.      Comments: Scattered wheeze.  Cardiovascular:      Normal rate. Regular rhythm. S1 with normal intensity. S2 with normal intensity.       Murmurs: There is no murmur.      No gallop.  No click. No rub.   Pulses:     Dorsalis pedis: 3+ bilaterally.     Posterior tibial: 3+ bilaterally.  Edema:     Ankle: bilateral trace edema of the ankle.     Feet: bilateral trace edema of the feet.  Abdominal:      Palpations: Abdomen is soft.      Tenderness: There is no abdominal tenderness.   Neurological:      Mental Status: Alert.   Psychiatric:         Behavior: Behavior is cooperative.          Procedure   Procedures       Assessment & Plan     Diagnoses and all orders for this visit:    1. Coronary artery disease involving native coronary artery of native heart with angina pectoris (Primary)    2. Essential hypertension    3. Infrarenal abdominal aortic aneurysm (AAA) without rupture    4. COPD     5. Tobacco abuse    Other orders  -     ranolazine (Ranexa) 500 MG 12 hr tablet; Take 1 tablet by mouth 2 (Two) Times a Day.  Dispense: 60 tablet; Refill: 2  -     nicotine (NICODERM CQ)  21 MG/24HR patch; Place 1 patch on the skin as directed by provider Daily.  Dispense: 30 patch; Refill: 1    6.PVD      PLAN  -Discussed in detail regarding smoking cessation.  He understands that he is at a high risk for stroke, MI, heart failure, peripheral vascular disease, cancer, worsening lung function if he continues to smoke.  -Called in a prescription for nicotine patch 21 mg.  -Called in a prescription for Ranexa 500 mg p.o. twice daily.  We discussed in detail regarding further management of his angina.  He only has 1 maybe 2 episodes a month.  He is only on 1 antianginal agent.  He is not able to tolerate isosorbide and his blood pressure is soft and might not tolerate calcium channel blocker.  He is willing to try Ranexa .  -We also discussed coronary angiogram.  He wants to pursue medical management at this time.  He was advised to come to the emergency room right away if the chest pain gets worse or is not relieved by nitroglycerin.  -RTC in 1 month.  If his chest pain is stable can continue to monitor.  If he gets more frequent chest pain then we will set him up for coronary angiogram.  -Patient asked appropriate questions and agreed to the above plan.         MEDS ORDERED DURING VISIT:    Medications Discontinued During This Encounter   Medication Reason    ranolazine (RANEXA) 500 MG 12 hr tablet Duplicate order        New Medications Ordered This Visit   Medications    ranolazine (Ranexa) 500 MG 12 hr tablet     Sig: Take 1 tablet by mouth 2 (Two) Times a Day.     Dispense:  60 tablet     Refill:  2    nicotine (NICODERM CQ) 21 MG/24HR patch     Sig: Place 1 patch on the skin as directed by provider Daily.     Dispense:  30 patch     Refill:  1       Na Shade  reports that he has been smoking cigarettes. He has a 70 pack-year smoking history. He has been exposed to tobacco smoke. He has never used smokeless tobacco. I have educated him on the risk of diseases from using tobacco products such  as cancer, COPD, and heart disease.     I advised him to quit and he is willing to quit. We have discussed the following method/s for tobacco cessation:  Education Material.  Together we have set a quit date for 3 weeks from today.  He will follow up with me in 5 week or sooner to check on his progress.    I spent 5 minutes counseling the patient.            Diagnosis Plan   1. Coronary artery disease involving native coronary artery of native heart with angina pectoris        2. Essential hypertension        3. Infrarenal abdominal aortic aneurysm (AAA) without rupture        4. COPD         5. Tobacco abuse            Follow Up:   Return in about 4 weeks (around 10/28/2024) for Chest pain..    Patient was given instructions and counseling regarding his condition or for health maintenance advice. Please see specific information pulled into the AVS if appropriate.   As always, Chante Wei MD  I appreciate very much the opportunity to participate in the cardiovascular care of your patients. Please do not hesitate to call me with any questions with regards to Na Laguerre evaluation and management.         This document has been electronically signed by Zachary Johnsno MD, Interventional Cardiology  September 30, 2024 13:32 EDT    Dictated Utilizing Dragon Dictation: Part of this note may be an electronic transcription/translation of spoken language to printed text using the Dragon Dictation System.

## 2024-09-30 NOTE — PROGRESS NOTES
A [facility] message has been sent to the patient for patient rounding for [facility]-Interventional Cardiology

## 2024-12-18 ENCOUNTER — TELEPHONE (OUTPATIENT)
Dept: CARDIOLOGY | Facility: CLINIC | Age: 64
End: 2024-12-18
Payer: MEDICARE

## 2024-12-18 NOTE — TELEPHONE ENCOUNTER
Contacted PCP to see if patient had any recent labs They state the last time was 07/2024-they are sending-do want him to get more before being seen as they have been ordered and not completed the last 2 office visits here?

## 2025-01-07 RX ORDER — RANOLAZINE 500 MG/1
500 TABLET, EXTENDED RELEASE ORAL 2 TIMES DAILY
Qty: 60 TABLET | Refills: 2 | Status: SHIPPED | OUTPATIENT
Start: 2025-01-07

## 2025-05-13 RX ORDER — RANOLAZINE 500 MG/1
500 TABLET, EXTENDED RELEASE ORAL 2 TIMES DAILY
Qty: 60 TABLET | Refills: 2 | Status: SHIPPED | OUTPATIENT
Start: 2025-05-13

## 2025-05-20 RX ORDER — NITROGLYCERIN 0.4 MG/1
TABLET SUBLINGUAL
Qty: 25 TABLET | Refills: 2 | Status: SHIPPED | OUTPATIENT
Start: 2025-05-20